# Patient Record
Sex: MALE | Race: BLACK OR AFRICAN AMERICAN | NOT HISPANIC OR LATINO | URBAN - METROPOLITAN AREA
[De-identification: names, ages, dates, MRNs, and addresses within clinical notes are randomized per-mention and may not be internally consistent; named-entity substitution may affect disease eponyms.]

---

## 2021-04-16 ENCOUNTER — INPATIENT (INPATIENT)
Facility: HOSPITAL | Age: 61
LOS: 9 days | Discharge: HOME | End: 2021-04-26
Attending: HOSPITALIST | Admitting: HOSPITALIST
Payer: MEDICARE

## 2021-04-16 VITALS
RESPIRATION RATE: 17 BRPM | HEART RATE: 101 BPM | OXYGEN SATURATION: 98 % | TEMPERATURE: 96 F | DIASTOLIC BLOOD PRESSURE: 116 MMHG | SYSTOLIC BLOOD PRESSURE: 208 MMHG

## 2021-04-16 LAB
ALBUMIN SERPL ELPH-MCNC: 4.5 G/DL — SIGNIFICANT CHANGE UP (ref 3.5–5.2)
ALP SERPL-CCNC: 186 U/L — HIGH (ref 30–115)
ALT FLD-CCNC: 50 U/L — HIGH (ref 0–41)
ANION GAP SERPL CALC-SCNC: 16 MMOL/L — HIGH (ref 7–14)
APPEARANCE UR: ABNORMAL
APTT BLD: 33.4 SEC — SIGNIFICANT CHANGE UP (ref 27–39.2)
AST SERPL-CCNC: 89 U/L — HIGH (ref 0–41)
BACTERIA # UR AUTO: NEGATIVE — SIGNIFICANT CHANGE UP
BASOPHILS # BLD AUTO: 0.02 K/UL — SIGNIFICANT CHANGE UP (ref 0–0.2)
BASOPHILS NFR BLD AUTO: 0.2 % — SIGNIFICANT CHANGE UP (ref 0–1)
BILIRUB DIRECT SERPL-MCNC: 0.3 MG/DL — HIGH (ref 0–0.2)
BILIRUB INDIRECT FLD-MCNC: 0.6 MG/DL — SIGNIFICANT CHANGE UP (ref 0.2–1.2)
BILIRUB SERPL-MCNC: 0.9 MG/DL — SIGNIFICANT CHANGE UP (ref 0.2–1.2)
BILIRUB UR-MCNC: ABNORMAL
BLD GP AB SCN SERPL QL: SIGNIFICANT CHANGE UP
BUN SERPL-MCNC: 8 MG/DL — LOW (ref 10–20)
CALCIUM SERPL-MCNC: 9.8 MG/DL — SIGNIFICANT CHANGE UP (ref 8.5–10.1)
CHLORIDE SERPL-SCNC: 101 MMOL/L — SIGNIFICANT CHANGE UP (ref 98–110)
CO2 SERPL-SCNC: 24 MMOL/L — SIGNIFICANT CHANGE UP (ref 17–32)
COLOR SPEC: YELLOW — SIGNIFICANT CHANGE UP
CREAT SERPL-MCNC: 0.8 MG/DL — SIGNIFICANT CHANGE UP (ref 0.7–1.5)
DIFF PNL FLD: SIGNIFICANT CHANGE UP
EOSINOPHIL # BLD AUTO: 0 K/UL — SIGNIFICANT CHANGE UP (ref 0–0.7)
EOSINOPHIL NFR BLD AUTO: 0 % — SIGNIFICANT CHANGE UP (ref 0–8)
EPI CELLS # UR: 0 /HPF — SIGNIFICANT CHANGE UP (ref 0–5)
GLUCOSE SERPL-MCNC: 92 MG/DL — SIGNIFICANT CHANGE UP (ref 70–99)
GLUCOSE UR QL: NEGATIVE — SIGNIFICANT CHANGE UP
HCT VFR BLD CALC: 49 % — SIGNIFICANT CHANGE UP (ref 42–52)
HGB BLD-MCNC: 16.8 G/DL — SIGNIFICANT CHANGE UP (ref 14–18)
HYALINE CASTS # UR AUTO: 2 /LPF — SIGNIFICANT CHANGE UP (ref 0–7)
IMM GRANULOCYTES NFR BLD AUTO: 0.3 % — SIGNIFICANT CHANGE UP (ref 0.1–0.3)
INR BLD: 1.1 RATIO — SIGNIFICANT CHANGE UP (ref 0.65–1.3)
KETONES UR-MCNC: ABNORMAL
LACTATE SERPL-SCNC: 0.9 MMOL/L — SIGNIFICANT CHANGE UP (ref 0.7–2)
LEUKOCYTE ESTERASE UR-ACNC: NEGATIVE — SIGNIFICANT CHANGE UP
LIDOCAIN IGE QN: 82 U/L — HIGH (ref 7–60)
LYMPHOCYTES # BLD AUTO: 0.88 K/UL — LOW (ref 1.2–3.4)
LYMPHOCYTES # BLD AUTO: 9.9 % — LOW (ref 20.5–51.1)
MCHC RBC-ENTMCNC: 29.7 PG — SIGNIFICANT CHANGE UP (ref 27–31)
MCHC RBC-ENTMCNC: 34.3 G/DL — SIGNIFICANT CHANGE UP (ref 32–37)
MCV RBC AUTO: 86.7 FL — SIGNIFICANT CHANGE UP (ref 80–94)
MONOCYTES # BLD AUTO: 0.7 K/UL — HIGH (ref 0.1–0.6)
MONOCYTES NFR BLD AUTO: 7.9 % — SIGNIFICANT CHANGE UP (ref 1.7–9.3)
NEUTROPHILS # BLD AUTO: 7.25 K/UL — HIGH (ref 1.4–6.5)
NEUTROPHILS NFR BLD AUTO: 81.7 % — HIGH (ref 42.2–75.2)
NITRITE UR-MCNC: NEGATIVE — SIGNIFICANT CHANGE UP
NRBC # BLD: 0 /100 WBCS — SIGNIFICANT CHANGE UP (ref 0–0)
NT-PROBNP SERPL-SCNC: 195 PG/ML — SIGNIFICANT CHANGE UP (ref 0–300)
PH UR: 6 — SIGNIFICANT CHANGE UP (ref 5–8)
PLATELET # BLD AUTO: 148 K/UL — SIGNIFICANT CHANGE UP (ref 130–400)
POTASSIUM SERPL-MCNC: 4.1 MMOL/L — SIGNIFICANT CHANGE UP (ref 3.5–5)
POTASSIUM SERPL-SCNC: 4.1 MMOL/L — SIGNIFICANT CHANGE UP (ref 3.5–5)
PROT SERPL-MCNC: 8.9 G/DL — HIGH (ref 6–8)
PROT UR-MCNC: ABNORMAL
PROTHROM AB SERPL-ACNC: 12.6 SEC — SIGNIFICANT CHANGE UP (ref 9.95–12.87)
RBC # BLD: 5.65 M/UL — SIGNIFICANT CHANGE UP (ref 4.7–6.1)
RBC # FLD: 13.1 % — SIGNIFICANT CHANGE UP (ref 11.5–14.5)
RBC CASTS # UR COMP ASSIST: 4 /HPF — SIGNIFICANT CHANGE UP (ref 0–4)
SODIUM SERPL-SCNC: 141 MMOL/L — SIGNIFICANT CHANGE UP (ref 135–146)
SP GR SPEC: 1.02 — SIGNIFICANT CHANGE UP (ref 1.01–1.03)
TROPONIN T SERPL-MCNC: <0.01 NG/ML — SIGNIFICANT CHANGE UP
UROBILINOGEN FLD QL: ABNORMAL
WBC # BLD: 8.88 K/UL — SIGNIFICANT CHANGE UP (ref 4.8–10.8)
WBC # FLD AUTO: 8.88 K/UL — SIGNIFICANT CHANGE UP (ref 4.8–10.8)
WBC UR QL: 2 /HPF — SIGNIFICANT CHANGE UP (ref 0–5)

## 2021-04-16 PROCEDURE — 71275 CT ANGIOGRAPHY CHEST: CPT | Mod: 26,MG

## 2021-04-16 PROCEDURE — G1004: CPT

## 2021-04-16 PROCEDURE — 99285 EMERGENCY DEPT VISIT HI MDM: CPT

## 2021-04-16 PROCEDURE — 71046 X-RAY EXAM CHEST 2 VIEWS: CPT | Mod: 26

## 2021-04-16 PROCEDURE — 74177 CT ABD & PELVIS W/CONTRAST: CPT | Mod: 26,MG

## 2021-04-16 RX ORDER — ONDANSETRON 8 MG/1
4 TABLET, FILM COATED ORAL ONCE
Refills: 0 | Status: COMPLETED | OUTPATIENT
Start: 2021-04-16 | End: 2021-04-16

## 2021-04-16 RX ORDER — SODIUM CHLORIDE 9 MG/ML
2000 INJECTION INTRAMUSCULAR; INTRAVENOUS; SUBCUTANEOUS ONCE
Refills: 0 | Status: COMPLETED | OUTPATIENT
Start: 2021-04-16 | End: 2021-04-16

## 2021-04-16 RX ORDER — MORPHINE SULFATE 50 MG/1
4 CAPSULE, EXTENDED RELEASE ORAL ONCE
Refills: 0 | Status: DISCONTINUED | OUTPATIENT
Start: 2021-04-16 | End: 2021-04-16

## 2021-04-16 RX ADMIN — MORPHINE SULFATE 4 MILLIGRAM(S): 50 CAPSULE, EXTENDED RELEASE ORAL at 21:34

## 2021-04-16 RX ADMIN — ONDANSETRON 4 MILLIGRAM(S): 8 TABLET, FILM COATED ORAL at 18:27

## 2021-04-16 RX ADMIN — MORPHINE SULFATE 4 MILLIGRAM(S): 50 CAPSULE, EXTENDED RELEASE ORAL at 18:26

## 2021-04-16 RX ADMIN — SODIUM CHLORIDE 2000 MILLILITER(S): 9 INJECTION INTRAMUSCULAR; INTRAVENOUS; SUBCUTANEOUS at 18:26

## 2021-04-16 NOTE — ED PROVIDER NOTE - NS ED ROS FT
Eyes:  No visual changes, eye pain or discharge.  ENMT:  No hearing changes, pain, discharge or infections. No neck pain or stiffness.  Cardiac:  No CP, SOB or edema. No chest pain with exertion.  Respiratory:  No cough or respiratory distress. No hemoptysis. No history of asthma or RAD.  GI:  + nausea, + vomiting, + diarrhea + abdominal pain.  :  No dysuria, frequency or burning.  MS:  No myalgia, muscle weakness, joint pain or back pain.  Neuro:  No headache or weakness.  No LOC.  Skin:  No skin rash.   Endocrine: No history of thyroid disease or diabetes.  Except as documented in the HPI,  all other systems are negative.

## 2021-04-16 NOTE — ED PROVIDER NOTE - OBJECTIVE STATEMENT
Pt is a 61y/o male with a pmhx of alcoholism, HTN, DVT on xarelto presents today for eval of constant anna-umbilical/non-radiating abd pain with associated n/v/d x 2 days. Pt denies fever, chills, weakness, numbness, CP, SOB.

## 2021-04-16 NOTE — ED ADULT TRIAGE NOTE - CHIEF COMPLAINT QUOTE
Pt complaining of umbical pain, NVD x 3 days. Pt hx of alcoholism, DVT, HTN . Pt states he drinks about 6 pints a day. last drink this AM. FS in triage 106

## 2021-04-16 NOTE — ED PROVIDER NOTE - ATTENDING CONTRIBUTION TO CARE
Patient presents to ED c/o abd pain with n/v, Patient states that, he drinks alcohol daily and could not drink due to n/v, h/o DVT and could not take his medications due to N/V. patient is c/o sob, denies cp.   Vitals reviewed.   Patient is awake, alert, appears uncomfortable and in pain.   Lungs: CTA  abd: +BS, +periumbilical tenderness, ND, soft  A/P: Abd pain with n/v and also has sob, missed his elaquis x 2 days.   labs, IVF, imaging, reevaluation.   Symptomatic treatment.

## 2021-04-16 NOTE — ED PROVIDER NOTE - PHYSICAL EXAMINATION
VITAL SIGNS: I have reviewed nursing notes and confirm.  CONSTITUTIONAL: Well-developed; well-nourished; in no acute distress.   SKIN: skin exam is warm and dry, no acute rash.    HEAD: Normocephalic; atraumatic.  EYES: conjunctiva and sclera clear.  ENT: No nasal discharge; airway clear.  CARD: S1, S2 normal; no murmurs, gallops, or rubs. Regular rate and rhythm.   RESP: No wheezes, rales or rhonchi.  ABD: TTP LLQ, Periumbilical region, Normal bowel sounds; soft; non-distended  EXT: Normal ROM.  No clubbing, cyanosis or edema.   NEURO: Alert, oriented, grossly unremarkable

## 2021-04-17 DIAGNOSIS — R09.89 OTHER SPECIFIED SYMPTOMS AND SIGNS INVOLVING THE CIRCULATORY AND RESPIRATORY SYSTEMS: ICD-10-CM

## 2021-04-17 LAB — SARS-COV-2 RNA SPEC QL NAA+PROBE: SIGNIFICANT CHANGE UP

## 2021-04-17 PROCEDURE — 93010 ELECTROCARDIOGRAM REPORT: CPT

## 2021-04-17 PROCEDURE — 99223 1ST HOSP IP/OBS HIGH 75: CPT

## 2021-04-17 PROCEDURE — 76705 ECHO EXAM OF ABDOMEN: CPT | Mod: 26

## 2021-04-17 RX ORDER — AMLODIPINE BESYLATE 2.5 MG/1
5 TABLET ORAL DAILY
Refills: 0 | Status: DISCONTINUED | OUTPATIENT
Start: 2021-04-17 | End: 2021-04-26

## 2021-04-17 RX ORDER — FOLIC ACID 0.8 MG
1 TABLET ORAL DAILY
Refills: 0 | Status: DISCONTINUED | OUTPATIENT
Start: 2021-04-17 | End: 2021-04-26

## 2021-04-17 RX ORDER — SODIUM CHLORIDE 9 MG/ML
1000 INJECTION, SOLUTION INTRAVENOUS
Refills: 0 | Status: DISCONTINUED | OUTPATIENT
Start: 2021-04-17 | End: 2021-04-21

## 2021-04-17 RX ORDER — LABETALOL HCL 100 MG
10 TABLET ORAL ONCE
Refills: 0 | Status: DISCONTINUED | OUTPATIENT
Start: 2021-04-17 | End: 2021-04-17

## 2021-04-17 RX ORDER — CHLORHEXIDINE GLUCONATE 213 G/1000ML
1 SOLUTION TOPICAL
Refills: 0 | Status: DISCONTINUED | OUTPATIENT
Start: 2021-04-17 | End: 2021-04-26

## 2021-04-17 RX ORDER — PANTOPRAZOLE SODIUM 20 MG/1
40 TABLET, DELAYED RELEASE ORAL
Refills: 0 | Status: DISCONTINUED | OUTPATIENT
Start: 2021-04-17 | End: 2021-04-26

## 2021-04-17 RX ORDER — MORPHINE SULFATE 50 MG/1
2 CAPSULE, EXTENDED RELEASE ORAL EVERY 4 HOURS
Refills: 0 | Status: DISCONTINUED | OUTPATIENT
Start: 2021-04-17 | End: 2021-04-21

## 2021-04-17 RX ORDER — RIVAROXABAN 15 MG-20MG
20 KIT ORAL
Refills: 0 | Status: DISCONTINUED | OUTPATIENT
Start: 2021-04-17 | End: 2021-04-26

## 2021-04-17 RX ORDER — SODIUM CHLORIDE 9 MG/ML
1000 INJECTION INTRAMUSCULAR; INTRAVENOUS; SUBCUTANEOUS
Refills: 0 | Status: DISCONTINUED | OUTPATIENT
Start: 2021-04-17 | End: 2021-04-17

## 2021-04-17 RX ORDER — THIAMINE MONONITRATE (VIT B1) 100 MG
100 TABLET ORAL ONCE
Refills: 0 | Status: COMPLETED | OUTPATIENT
Start: 2021-04-17 | End: 2021-04-17

## 2021-04-17 RX ORDER — OXYCODONE AND ACETAMINOPHEN 5; 325 MG/1; MG/1
1 TABLET ORAL EVERY 6 HOURS
Refills: 0 | Status: DISCONTINUED | OUTPATIENT
Start: 2021-04-17 | End: 2021-04-24

## 2021-04-17 RX ADMIN — SODIUM CHLORIDE 150 MILLILITER(S): 9 INJECTION, SOLUTION INTRAVENOUS at 08:04

## 2021-04-17 RX ADMIN — MORPHINE SULFATE 2 MILLIGRAM(S): 50 CAPSULE, EXTENDED RELEASE ORAL at 14:26

## 2021-04-17 RX ADMIN — MORPHINE SULFATE 2 MILLIGRAM(S): 50 CAPSULE, EXTENDED RELEASE ORAL at 09:38

## 2021-04-17 RX ADMIN — MORPHINE SULFATE 2 MILLIGRAM(S): 50 CAPSULE, EXTENDED RELEASE ORAL at 14:41

## 2021-04-17 RX ADMIN — Medication 1 MILLIGRAM(S): at 11:29

## 2021-04-17 RX ADMIN — SODIUM CHLORIDE 150 MILLILITER(S): 9 INJECTION, SOLUTION INTRAVENOUS at 16:00

## 2021-04-17 RX ADMIN — PANTOPRAZOLE SODIUM 40 MILLIGRAM(S): 20 TABLET, DELAYED RELEASE ORAL at 05:42

## 2021-04-17 RX ADMIN — AMLODIPINE BESYLATE 5 MILLIGRAM(S): 2.5 TABLET ORAL at 06:54

## 2021-04-17 RX ADMIN — MORPHINE SULFATE 2 MILLIGRAM(S): 50 CAPSULE, EXTENDED RELEASE ORAL at 04:15

## 2021-04-17 RX ADMIN — MORPHINE SULFATE 2 MILLIGRAM(S): 50 CAPSULE, EXTENDED RELEASE ORAL at 19:52

## 2021-04-17 RX ADMIN — SODIUM CHLORIDE 200 MILLILITER(S): 9 INJECTION INTRAMUSCULAR; INTRAVENOUS; SUBCUTANEOUS at 05:42

## 2021-04-17 RX ADMIN — Medication 1 TABLET(S): at 11:29

## 2021-04-17 RX ADMIN — MORPHINE SULFATE 2 MILLIGRAM(S): 50 CAPSULE, EXTENDED RELEASE ORAL at 09:23

## 2021-04-17 RX ADMIN — SODIUM CHLORIDE 200 MILLILITER(S): 9 INJECTION INTRAMUSCULAR; INTRAVENOUS; SUBCUTANEOUS at 04:06

## 2021-04-17 RX ADMIN — RIVAROXABAN 20 MILLIGRAM(S): KIT at 17:26

## 2021-04-17 RX ADMIN — CHLORHEXIDINE GLUCONATE 1 APPLICATION(S): 213 SOLUTION TOPICAL at 05:42

## 2021-04-17 RX ADMIN — MORPHINE SULFATE 2 MILLIGRAM(S): 50 CAPSULE, EXTENDED RELEASE ORAL at 20:49

## 2021-04-17 RX ADMIN — SODIUM CHLORIDE 150 MILLILITER(S): 9 INJECTION, SOLUTION INTRAVENOUS at 22:38

## 2021-04-17 RX ADMIN — Medication 100 MILLIGRAM(S): at 04:15

## 2021-04-17 NOTE — H&P ADULT - NSICDXPASTMEDICALHX_GEN_ALL_CORE_FT
PAST MEDICAL HISTORY:  DVT (deep venous thrombosis)     EtOH dependence     H/O chronic pancreatitis     HTN (hypertension)

## 2021-04-17 NOTE — H&P ADULT - NSHPPHYSICALEXAM_GEN_ALL_CORE
Vital Signs (24 Hrs):  T(C): 35.8 (04-16-21 @ 16:31), Max: 35.8 (04-16-21 @ 16:31)  HR: 80 (04-17-21 @ 03:00) (80 - 101)  BP: 169/74 (04-17-21 @ 03:00) (169/74 - 208/116)  RR: 18 (04-17-21 @ 03:00) (16 - 18)  SpO2: 99% (04-17-21 @ 03:00) (98% - 99%) Vital Signs (24 Hrs):  T(C): 35.8 (04-16-21 @ 16:31), Max: 35.8 (04-16-21 @ 16:31)  HR: 80 (04-17-21 @ 03:00) (80 - 101)  BP: 169/74 (04-17-21 @ 03:00) (169/74 - 208/116)  RR: 18 (04-17-21 @ 03:00) (16 - 18)  SpO2: 99% (04-17-21 @ 03:00) (98% - 99%)    PHYSICAL EXAM:  GENERAL: NAD, lying in bed comfortably  HEAD:  Atraumatic, Normocephalic  EYES: EOMI, PERRLA, conjunctiva and sclera clear  ENT: Moist mucous membranes  NECK: Supple, No JVD  CHEST/LUNG: Clear to auscultation bilaterally; No rales, rhonchi, wheezing, or rubs. Unlabored respirations  HEART: Regular rate and rhythm; No murmurs, rubs, or gallops  ABDOMEN: diffuse abdominal tenderness , no rebound / guarding , myers sign negative  EXTREMITIES:  2+ Peripheral Pulses, brisk capillary refill. No clubbing, cyanosis, or edema  NERVOUS SYSTEM:  Alert & Oriented X3, speech clear. No deficits   MSK: FROM all 4 extremities, full and equal strength  SKIN: No rashes or lesions

## 2021-04-17 NOTE — H&P ADULT - HISTORY OF PRESENT ILLNESS
59 yo male with pmh of alcoholism, HTN, DVT on Xarelto presents to the hospital with 2 day history of constant non radiating anna-umbilical abdominal pain associated with nausea and nbnb vomiting.  The patient reports      T(C): 35.8 , HR: 80 ,BP: 169/74 , RR: 18, SpO2: 99% on room air  Labs: transaminitis, lipase 82   RUQ U/S: hepatic steatosis; no cholelithiasis , CBD and intrahepatic biliary ducts unremarkable  CT Abdomen and Pelvis w/ IV Cont:  acute on chronic pancreatitis. No peripancreatic fluid collection. Main pancreatic duct measures 5-6 mm, nonspecific and without priors for comparison.               61 yo male with pmh of alcohol use disorder, HTN, DVT on Xarelto presents to the hospital with 2 day history of constant 10/10 non radiating anna-umbilical abdominal pain associated with nausea and nbnb vomiting. He reports he drinks about 5 pints of vodka / wine daily and his last drink was yesterday which resulted in vomiting. The patient notes that he has been drinking for many years. All other ros negative including chest pain, shortness of breath, constipation, diarrhea, dysuria       T(C): 35.8 , HR: 80 ,BP: 169/74 , RR: 18, SpO2: 99% on room air  Labs: transaminitis, lipase 82   RUQ U/S: hepatic steatosis; no cholelithiasis , CBD and intrahepatic biliary ducts unremarkable  CT Abdomen and Pelvis w/ IV Cont:  acute on chronic pancreatitis. No peripancreatic fluid collection. Main pancreatic duct measures 5-6 mm, nonspecific and without priors for comparison.

## 2021-04-17 NOTE — H&P ADULT - ASSESSMENT
61 yo male with pmh of alcoholism, HTN, DVT on Xarelto presents to the hospital with 2 day history of constant non radiating anna-umbilical abdominal pain associated with nausea and nbnb vomiting.      # Acute on chronic pancreatitis  - no sirs on admission  - CT Abdomen and Pelvis w/ IV Cont:  acute on chronic pancreatitis. Pancreatic calcifications No peripancreatic fluid collection.   - RUQ U/S: hepatic steatosis; no cholelithiasis , CBD and intrahepatic biliary ducts unremarkable  - lipase 82   - MRCP  - NS @ 200 cc/hr   - monitor bun, hct, fluid status  - NPO , advance when symptoms improve  - pain control       # Incidental finding of main pancreatic duct of 5-6 mm  likely secondary to h/o chronic pancreatitis , f/u with GI outpatient    # h/o HTN    # h/o DVT , on Xarelto    # h/o Alcohol use disorder  - last drink       # DVT PPX: Xarelto  # GI PPX: PPI  # Activity: as tolerated  # Diet: NPO  # CHG BATH   59 yo male with pmh of alcoholism, HTN, DVT on Xarelto presents to the hospital with 2 day history of constant non radiating anna-umbilical abdominal pain associated with nausea and nbnb vomiting.      # Acute on chronic pancreatitis  - no sirs on admission  - CT A/P w/ IV Cont:acute on chronic pancreatitis. Pancreatic calcifications No peripancreatic fluid collection. Main pancreatic duct of 5-6 mm  - RUQ U/S: hepatic steatosis; no cholelithiasis , CBD and intrahepatic biliary ducts unremarkable  - lipase 82   - MRCP f/u   - NS @ 200 cc/hr   - monitor bun, hct, fluid status  - NPO , advance when symptoms improve  - pain control       # h/o HTN  - patient takes 5 mg and 10 mg of amlodipine at home   - hold 10 mg , start 5 mg and increase prn to 10 if needed    # h/o b/l DVT , on Xarelto    # h/o Alcohol use disorder  - last drink 1 day ago  - [facility] protocol   - catch team  - thiamine x1   - c/w folic acid and start multivitamin    # DVT PPX: Xarelto  # GI PPX: PPI  # Activity: as tolerated  # Diet: NPO  # CHG BATH

## 2021-04-17 NOTE — H&P ADULT - NSHPLABSRESULTS_GEN_ALL_CORE
16.8   8.88  )-----------( 148      ( 16 Apr 2021 18:02 )             49.0     04-16-21 @ 18:02    141  |  101  |  8<L>             --------------------------< 92     4.1  |  24  | 0.8    eGFR AA: 112  eGFR N-AA: 96    Calcium: 9.8  Phosphorus: --  Magnesium: --    AST: 89<H>    ALT: 50<H>  AlkPhos: 186<H>  Protein: 8.9<H>  Albumin: 4.5  TBili: 0.9  D-Bili: 0.3<H>    Troponin T, Serum (04.16.21 @ 18:02)    Troponin T, Serum: <0.01 ng/mL    Lipase, Serum (04.16.21 @ 18:02)    Lipase, Serum: 82 U/L    < from: US Abdomen Limited (04.17.21 @ 00:16) >    IMPRESSION:    Hepatic steatosis.  Pancreas obscured by overlying bowel gas.  Otherwise unremarkable right upper quadrant ultrasound.    < end of copied text >    < from: CT Abdomen and Pelvis w/ IV Cont (04.16.21 @ 21:37) >    IMPRESSION:    No evidence for acute pulmonary embolus.    Findings compatible with acute on chronic pancreatitis.  No peripancreatic fluid collection.  Main pancreatic duct measures 5-6 mm, nonspecific and without priors for comparison.    < end of copied text >

## 2021-04-17 NOTE — H&P ADULT - ATTENDING COMMENTS
HPI:  61 yo male with pmh of alcohol use disorder, HTN, DVT on Xarelto presents to the hospital with 2 day history of constant 10/10 non radiating anna-umbilical abdominal pain associated with nausea and nbnb vomiting. He reports he drinks about 5 pints of vodka / wine daily and his last drink was yesterday which resulted in vomiting. The patient notes that he has been drinking for many years. All other ros negative including chest pain, shortness of breath, constipation, diarrhea, dysuria       T(C): 35.8 , HR: 80 ,BP: 169/74 , RR: 18, SpO2: 99% on room air  Labs: transaminitis, lipase 82   RUQ U/S: hepatic steatosis; no cholelithiasis , CBD and intrahepatic biliary ducts unremarkable  CT Abdomen and Pelvis w/ IV Cont:  acute on chronic pancreatitis. No peripancreatic fluid collection. Main pancreatic duct measures 5-6 mm, nonspecific and without priors for comparison.     (17 Apr 2021 03:16)    REVIEW OF SYSTEMS:   CONSTITUTIONAL: No weakness, fevers or chills  EYES/ENT: No visual changes;  No vertigo or throat pain   NECK: No pain or stiffness  RESPIRATORY: No cough, wheezing, hemoptysis; No shortness of breath  CARDIOVASCULAR: No chest pain or palpitations  GASTROINTESTINAL: (+) abdominal - epigastric pain. No nausea, vomiting, or hematemesis; No diarrhea or constipation. No melena or hematochezia.  GENITOURINARY: No dysuria, frequency or hematuria  NEUROLOGICAL: No numbness or weakness  SKIN: No itching, rashes    Physical Exam:   General: WN/WD NAD  Neurology: A&Ox3, nonfocal, follows commands, mild tremors   Eyes: PERRLA/ EOMI  ENT/Neck: Neck supple, trachea midline, No JVD  Respiratory: CTA B/L, No wheezing, rales, rhonchi  CV: Normal rate regular rhythm, S1S2, no murmurs, rubs or gallops  Abdominal: Soft, NT, ND +BS,   Extremities: No edema, + peripheral pulses  Skin: No Rashes, Hematoma, Ecchymosis  Incisions: n/a  Tubes: n/a    A/p  Acute on chronic alcoholic pancreatitis   -NPO and aggressive hydration   -serial CMP, Lipase and CBC   -PRN pain Rx    HTN - possibly related to Alcohol use disorder   -c/w amlodipine     H/o bilateral DVT on lifelong Rx  -patient re-educated about need for compliance     Alcohol use disorder   -cessation d/w patient   -MVI/ Thiamine / Folic acid  -[facility] protocol   -Catch team HPI:  61 yo male with pmh of alcohol use disorder, HTN, DVT on Xarelto presents to the hospital with 2 day history of constant 10/10 non radiating anna-umbilical abdominal pain associated with nausea and nbnb vomiting. He reports he drinks about 5 pints of vodka / wine daily and his last drink was yesterday which resulted in vomiting. The patient notes that he has been drinking for many years. All other ros negative including chest pain, shortness of breath, constipation, diarrhea, dysuria       T(C): 35.8 , HR: 80 ,BP: 169/74 , RR: 18, SpO2: 99% on room air  Labs: transaminitis, lipase 82   RUQ U/S: hepatic steatosis; no cholelithiasis , CBD and intrahepatic biliary ducts unremarkable  CT Abdomen and Pelvis w/ IV Cont:  acute on chronic pancreatitis. No peripancreatic fluid collection. Main pancreatic duct measures 5-6 mm, nonspecific and without priors for comparison.     (17 Apr 2021 03:16)    REVIEW OF SYSTEMS: see cc/HPI   CONSTITUTIONAL: No weakness, fevers or chills  EYES/ENT: No visual changes;  No vertigo or throat pain   NECK: No pain or stiffness  RESPIRATORY: No cough, wheezing, hemoptysis; No shortness of breath  CARDIOVASCULAR: No chest pain or palpitations  GASTROINTESTINAL: (+) abdominal - epigastric pain. (+) nausea/vomiting, NO hematemesis; No diarrhea or constipation. No melena or hematochezia.  GENITOURINARY: No dysuria, frequency or hematuria  NEUROLOGICAL: No numbness or weakness, (+) tremors   SKIN: No itching, rashes    Physical Exam:   General: WN/WD NAD  Neurology: A&Ox3, nonfocal, follows commands, mild tremors   Eyes: PERRLA/ EOMI  ENT/Neck: Neck supple, trachea midline, No JVD  Respiratory: CTA B/L, No wheezing, rales, rhonchi  CV: Normal rate regular rhythm, S1S2, no murmurs, rubs or gallops  Abdominal: Soft, (+) tenderness diffusely but worse in the lower quadrants, (+) guarding, ND +BS,   Extremities: No edema, + peripheral pulses  Skin: No Rashes, Hematoma, Ecchymosis  Incisions: n/a  Tubes: n/a    A/p  Acute on chronic alcoholic pancreatitis   -NPO and aggressive hydration   -serial CMP, Lipase and CBC   -PRN pain Rx  -IV PPI    HTN - possibly related to Alcohol use disorder   -c/w amlodipine     H/o bilateral DVT on lifelong Rx  -patient re-educated about need for compliance     Alcohol use disorder   -cessation d/w patient   -MVI/ Thiamine / Folic acid  -CIWA protocol   -Catch team HPI:  61 yo male with pmh of alcohol use disorder, HTN, DVT on Xarelto presents to the hospital with 2 day history of constant 10/10 non radiating anna-umbilical abdominal pain associated with nausea and nbnb vomiting. He reports he drinks about 5 pints of vodka / wine daily and his last drink was yesterday which resulted in vomiting. The patient notes that he has been drinking for many years. All other ros negative including chest pain, shortness of breath, constipation, diarrhea, dysuria       T(C): 35.8 , HR: 80 ,BP: 169/74 , RR: 18, SpO2: 99% on room air  Labs: transaminitis, lipase 82   RUQ U/S: hepatic steatosis; no cholelithiasis , CBD and intrahepatic biliary ducts unremarkable  CT Abdomen and Pelvis w/ IV Cont:  acute on chronic pancreatitis. No peripancreatic fluid collection. Main pancreatic duct measures 5-6 mm, nonspecific and without priors for comparison.     (17 Apr 2021 03:16)    REVIEW OF SYSTEMS: see cc/HPI   CONSTITUTIONAL: No weakness, fevers or chills  EYES/ENT: No visual changes;  No vertigo or throat pain   NECK: No pain or stiffness  RESPIRATORY: No cough, wheezing, hemoptysis; No shortness of breath  CARDIOVASCULAR: No chest pain or palpitations  GASTROINTESTINAL: (+) abdominal - epigastric pain. (+) nausea/vomiting, NO hematemesis; No diarrhea or constipation. No melena or hematochezia.  GENITOURINARY: No dysuria, frequency or hematuria  NEUROLOGICAL: No numbness or weakness, (+) tremors   SKIN: No itching, rashes    Physical Exam:   General: WN/WD NAD  Neurology: A&Ox3, nonfocal, follows commands, mild tremors   Eyes: PERRLA/ EOMI  ENT/Neck: Neck supple, trachea midline, No JVD  Respiratory: CTA B/L, No wheezing, rales, rhonchi  CV: Normal rate regular rhythm, S1S2, no murmurs, rubs or gallops  Abdominal: Soft, (+) tenderness diffusely but worse in the lower quadrants, (+) guarding, ND +BS,   Extremities: No edema, + peripheral pulses  Skin: No Rashes, Hematoma, Ecchymosis  Incisions: n/a  Tubes: n/a    A/p  Acute on chronic alcoholic pancreatitis   -NPO and aggressive hydration   -serial CMP, Lipase, lipid panel and CBC   -PRN pain Rx  -IV PPI    HTN - possibly related to Alcohol use disorder   -c/w amlodipine     H/o bilateral DVT on lifelong Rx  -patient re-educated about need for compliance     Alcohol use disorder   -cessation d/w patient   -MVI/ Thiamine / Folic acid  -[facility] protocol   -Catch team

## 2021-04-17 NOTE — CONSULT NOTE ADULT - SUBJECTIVE AND OBJECTIVE BOX
Addiction medicine consult placed for "alcohol abuse, here for pancreatitis, drinks vodka". Per resident, patient is already on CIWA protocol with ativan, no acute withdrawal management recommend recommendations necessary at this time, though patient does require referral for services.  CATCH team social workers made aware, will follow patient.

## 2021-04-17 NOTE — PROGRESS NOTE ADULT - SUBJECTIVE AND OBJECTIVE BOX
pt seen and examined.   still has epigastric pain, moderated.         Vital Signs Last 24 Hrs  T(C): 35.8 (16 Apr 2021 16:31), Max: 35.8 (16 Apr 2021 16:31)  T(F): 96.5 (16 Apr 2021 16:31), Max: 96.5 (16 Apr 2021 16:31)  HR: 66 (17 Apr 2021 10:54) (66 - 101)  BP: 180/108 (17 Apr 2021 10:54) (169/74 - 208/116)  BP(mean): --  RR: 18 (17 Apr 2021 06:50) (16 - 18)  SpO2: 98% (17 Apr 2021 07:35) (98% - 99%)  Daily     Daily   I&O's Summary      Physical exam:   constitutional NAD, AAOX3, Respiratory  lungs CTA, CVS heart RRR, GI: abdomen Soft NT, ND, BS+, skin: intact  neuro exam non focal.   POCT Blood Glucose.: 106 mg/dL (04-16-21 @ 16:35)                          16.8   8.88  )-----------( 148      ( 16 Apr 2021 18:02 )             49.0     04-16    141  |  101  |  8<L>  ----------------------------<  92  4.1   |  24  |  0.8    Ca    9.8      16 Apr 2021 18:02    TPro  8.9<H>  /  Alb  4.5  /  TBili  0.9  /  DBili  0.3<H>  /  AST  89<H>  /  ALT  50<H>  /  AlkPhos  186<H>  04-16      COVID-19 PCR: NotDetec (04-17-21 @ 02:03)      CARDIAC MARKERS ( 16 Apr 2021 18:02 )  x     / <0.01 ng/mL / x     / x     / x          PT/INR - ( 16 Apr 2021 18:02 )   PT: 12.60 sec;   INR: 1.10 ratio    PTT - ( 16 Apr 2021 18:02 )  PTT:33.4 sec      MEDICATIONS  (STANDING):  amLODIPine   Tablet 5 milliGRAM(s) Oral daily  chlorhexidine 4% Liquid 1 Application(s) Topical <User Schedule>  folic acid 1 milliGRAM(s) Oral daily  lactated ringers. 1000 milliLiter(s) (150 mL/Hr) IV Continuous <Continuous>  multivitamin 1 Tablet(s) Oral daily  pantoprazole    Tablet 40 milliGRAM(s) Oral before breakfast  rivaroxaban 20 milliGRAM(s) Oral with dinner    MEDICATIONS  (PRN):  LORazepam   Injectable 2 milliGRAM(s) IV Push every 2 hours PRN Symptom-triggered: 2 point increase in CIWA -Ar score and a total score of 7 or LESS  morphine  - Injectable 2 milliGRAM(s) IV Push every 4 hours PRN Severe Pain (7 - 10)  oxycodone    5 mG/acetaminophen 325 mG 1 Tablet(s) Oral every 6 hours PRN Moderate Pain (4 - 6)      PAST MEDICAL & SURGICAL HISTORY:  DVT (deep venous thrombosis)    HTN (hypertension)    EtOH dependence    H/O chronic pancreatitis    < from: CT Abdomen and Pelvis w/ IV Cont (04.16.21 @ 21:37) >  No evidence for acute pulmonary embolus.    Findings compatible with acute on chronic pancreatitis.  No peripancreatic fluid collection.  Main pancreatic duct measures 5-6 mm, nonspecific and without priors for comparison.    < end of copied text >    a/p  # acute pancreatitis, due to etoh, last drink was friday ( yesterday), no sign of withdrawal.   clear liquids, advance as tolerated  cont ivf  pain meds    # etoh abuse, ciwa for prn symtoms, no need for meds now  # poss thimain and folate def , replace    #Progress Note Handoff  Pending (specify):  clinical improvement   Family discussion: dw pt, pt is fully aaox3, understands the plan of care  Disposition: Home___

## 2021-04-18 LAB
ALBUMIN SERPL ELPH-MCNC: 3.4 G/DL — LOW (ref 3.5–5.2)
ALP SERPL-CCNC: 107 U/L — SIGNIFICANT CHANGE UP (ref 30–115)
ALT FLD-CCNC: 27 U/L — SIGNIFICANT CHANGE UP (ref 0–41)
ANION GAP SERPL CALC-SCNC: 15 MMOL/L — HIGH (ref 7–14)
AST SERPL-CCNC: 46 U/L — HIGH (ref 0–41)
BILIRUB SERPL-MCNC: 0.8 MG/DL — SIGNIFICANT CHANGE UP (ref 0.2–1.2)
BUN SERPL-MCNC: 7 MG/DL — LOW (ref 10–20)
CALCIUM SERPL-MCNC: 8.7 MG/DL — SIGNIFICANT CHANGE UP (ref 8.5–10.1)
CHLORIDE SERPL-SCNC: 101 MMOL/L — SIGNIFICANT CHANGE UP (ref 98–110)
CHOLEST SERPL-MCNC: 169 MG/DL — SIGNIFICANT CHANGE UP
CO2 SERPL-SCNC: 23 MMOL/L — SIGNIFICANT CHANGE UP (ref 17–32)
CREAT SERPL-MCNC: 0.7 MG/DL — SIGNIFICANT CHANGE UP (ref 0.7–1.5)
CULTURE RESULTS: NO GROWTH — SIGNIFICANT CHANGE UP
GLUCOSE SERPL-MCNC: 81 MG/DL — SIGNIFICANT CHANGE UP (ref 70–99)
HCT VFR BLD CALC: 38.8 % — LOW (ref 42–52)
HDLC SERPL-MCNC: 62 MG/DL — SIGNIFICANT CHANGE UP
HGB BLD-MCNC: 13.1 G/DL — LOW (ref 14–18)
LIPID PNL WITH DIRECT LDL SERPL: 92 MG/DL — SIGNIFICANT CHANGE UP
MAGNESIUM SERPL-MCNC: 1 MG/DL — LOW (ref 1.8–2.4)
MCHC RBC-ENTMCNC: 29.4 PG — SIGNIFICANT CHANGE UP (ref 27–31)
MCHC RBC-ENTMCNC: 33.8 G/DL — SIGNIFICANT CHANGE UP (ref 32–37)
MCV RBC AUTO: 87 FL — SIGNIFICANT CHANGE UP (ref 80–94)
NON HDL CHOLESTEROL: 107 MG/DL — SIGNIFICANT CHANGE UP
NRBC # BLD: 0 /100 WBCS — SIGNIFICANT CHANGE UP (ref 0–0)
PLATELET # BLD AUTO: 95 K/UL — LOW (ref 130–400)
POTASSIUM SERPL-MCNC: 3.9 MMOL/L — SIGNIFICANT CHANGE UP (ref 3.5–5)
POTASSIUM SERPL-SCNC: 3.9 MMOL/L — SIGNIFICANT CHANGE UP (ref 3.5–5)
PROT SERPL-MCNC: 6.3 G/DL — SIGNIFICANT CHANGE UP (ref 6–8)
RBC # BLD: 4.46 M/UL — LOW (ref 4.7–6.1)
RBC # FLD: 12.9 % — SIGNIFICANT CHANGE UP (ref 11.5–14.5)
SODIUM SERPL-SCNC: 139 MMOL/L — SIGNIFICANT CHANGE UP (ref 135–146)
SPECIMEN SOURCE: SIGNIFICANT CHANGE UP
TRIGL SERPL-MCNC: 96 MG/DL — SIGNIFICANT CHANGE UP
WBC # BLD: 3.53 K/UL — LOW (ref 4.8–10.8)
WBC # FLD AUTO: 3.53 K/UL — LOW (ref 4.8–10.8)

## 2021-04-18 PROCEDURE — 99232 SBSQ HOSP IP/OBS MODERATE 35: CPT

## 2021-04-18 RX ORDER — MAGNESIUM SULFATE 500 MG/ML
2 VIAL (ML) INJECTION
Refills: 0 | Status: COMPLETED | OUTPATIENT
Start: 2021-04-18 | End: 2021-04-18

## 2021-04-18 RX ADMIN — CHLORHEXIDINE GLUCONATE 1 APPLICATION(S): 213 SOLUTION TOPICAL at 05:32

## 2021-04-18 RX ADMIN — MORPHINE SULFATE 2 MILLIGRAM(S): 50 CAPSULE, EXTENDED RELEASE ORAL at 01:23

## 2021-04-18 RX ADMIN — Medication 50 GRAM(S): at 17:35

## 2021-04-18 RX ADMIN — RIVAROXABAN 20 MILLIGRAM(S): KIT at 17:35

## 2021-04-18 RX ADMIN — Medication 1 TABLET(S): at 11:44

## 2021-04-18 RX ADMIN — Medication 50 GRAM(S): at 16:05

## 2021-04-18 RX ADMIN — AMLODIPINE BESYLATE 5 MILLIGRAM(S): 2.5 TABLET ORAL at 05:32

## 2021-04-18 RX ADMIN — Medication 2 MILLIGRAM(S): at 11:48

## 2021-04-18 RX ADMIN — MORPHINE SULFATE 2 MILLIGRAM(S): 50 CAPSULE, EXTENDED RELEASE ORAL at 05:33

## 2021-04-18 RX ADMIN — Medication 50 GRAM(S): at 18:20

## 2021-04-18 RX ADMIN — Medication 1 MILLIGRAM(S): at 11:44

## 2021-04-18 RX ADMIN — MORPHINE SULFATE 2 MILLIGRAM(S): 50 CAPSULE, EXTENDED RELEASE ORAL at 16:18

## 2021-04-18 RX ADMIN — SODIUM CHLORIDE 150 MILLILITER(S): 9 INJECTION, SOLUTION INTRAVENOUS at 16:05

## 2021-04-18 RX ADMIN — SODIUM CHLORIDE 150 MILLILITER(S): 9 INJECTION, SOLUTION INTRAVENOUS at 11:49

## 2021-04-18 RX ADMIN — MORPHINE SULFATE 2 MILLIGRAM(S): 50 CAPSULE, EXTENDED RELEASE ORAL at 09:35

## 2021-04-18 RX ADMIN — MORPHINE SULFATE 2 MILLIGRAM(S): 50 CAPSULE, EXTENDED RELEASE ORAL at 14:29

## 2021-04-18 RX ADMIN — SODIUM CHLORIDE 150 MILLILITER(S): 9 INJECTION, SOLUTION INTRAVENOUS at 05:34

## 2021-04-18 RX ADMIN — PANTOPRAZOLE SODIUM 40 MILLIGRAM(S): 20 TABLET, DELAYED RELEASE ORAL at 05:32

## 2021-04-18 RX ADMIN — MORPHINE SULFATE 2 MILLIGRAM(S): 50 CAPSULE, EXTENDED RELEASE ORAL at 11:44

## 2021-04-18 RX ADMIN — MORPHINE SULFATE 2 MILLIGRAM(S): 50 CAPSULE, EXTENDED RELEASE ORAL at 07:18

## 2021-04-18 RX ADMIN — Medication 2 MILLIGRAM(S): at 14:34

## 2021-04-18 RX ADMIN — MORPHINE SULFATE 2 MILLIGRAM(S): 50 CAPSULE, EXTENDED RELEASE ORAL at 00:50

## 2021-04-18 NOTE — PROGRESS NOTE ADULT - ASSESSMENT
59 yo male with pmh of alcohol use disorder, HTN, DVT on Xarelto presents to the hospital with 2 day history of constant 10/10 non radiating anna-umbilical abdominal pain associated with nausea and nbnb vomiting. He reports he drinks about 5 pints of vodka / wine daily and his last drink was yesterday which resulted in vomiting. The patient notes that he has been drinking for many years, he  was diagnosed with acute recurrent alcohol induced pancreatitis, pt was started on IV hydration, pain meds PRN, kept NPO. Addiction medicine was consulted.    A/P     # Acute on chronic Etoh induced  pancreatitis  - aggressive IV hydration   - pain medications PRN   - started on clear liquid diet, still in pain   - will advance diet in 24 hours  if pt'll be less symptomatic   - he was consulted regarding Etoh abstinence  - f/u repeat pancreatic enzymes in AM      # Alcoholism / Etoh induced liver injury   - pt was consulted by abstinence   - ciwa protocol   - catch team consulted    - c/w folic acid and start multivitamin  - monitor and replete electrolytes , replete Mg today   - monitor LFTS  - RUQ US noted   - avoid hepatotoxic medications     # h/o HTN  - DASH diet   - on amlodipine     # Thrombocytopenia   - likely due to liver Dz  - monitor platelets     # h/o b/l DVT   -  on Xarelto  - repeat LE Doppler   - pt has a high risk for AC ( heavy drinker)     # DVT PPX: Xarelto  # GI PPX: PPI  # Activity: as tolerated    #Progress Note Handoff  Pending (specify):  c/w clear liquid diet, replete Mg, get LE Doppler, f/u repeat pancreatic enzymes in AM   Family discussion: I spoke with pt, he agreed with a plan of care   Disposition: Home_x __/SNF___/Other________/Unknown at this time________

## 2021-04-18 NOTE — PROGRESS NOTE ADULT - SUBJECTIVE AND OBJECTIVE BOX
Patient is a 60y old  Male who presents with a chief complaint of abdominal pain, was diagnosed with acute recurrent alcohol induced pancreatitis, pt was started on IV hydration, pain meds PRN, kept NPO. Addiction medicine was consulted.  Today pt is c/o constipation and LUQ abdominal pain, asking for pain medications.       Vital Signs Last 24 Hrs  T(C): 37.3 (2021 13:00), Max: 37.3 (2021 13:00)  T(F): 99.1 (2021 13:00), Max: 99.1 (2021 13:00)  HR: 86 (2021 13:00) (66 - 86)  BP: 159/104 (2021 13:00) (136/86 - 169/110)  BP(mean): --  RR: 18 (2021 13:00) (18 - 20)    PHYSICAL EXAM:  GENERAL: NAD, skinny   HEAD:  Atraumatic, Normocephalic  EYES: EOMI, PERRLA, conjunctiva and sclera clear  ENMT: No tonsillar erythema, exudates, or enlargement; Moist mucous membranes, Good dentition, No lesions  NECK: Supple, No JVD, Normal thyroid  NERVOUS SYSTEM:  Alert & Oriented X3, Good concentration; Motor Strength 5/5 B/L upper and lower extremities; DTRs 2+ intact and symmetric  CHEST/LUNG: Clear to percussion bilaterally; No rales, rhonchi, wheezing, or rubs  HEART: Regular rate and rhythm; No murmurs, rubs, or gallops  ABDOMEN: Soft, distended, diffuse tenderness noted on deep palpation, no rebound, no guarding, bowel sounds present  EXTREMITIES:  2+ Peripheral Pulses, No clubbing, cyanosis, or edema  LYMPH: No lymphadenopathy noted  SKIN: No rashes or lesions      LABS:                        13.1   3.53  )-----------( 95       ( 2021 09:14 )             38.8     04-    139  |  101  |  7<L>  ----------------------------<  81  3.9   |  23  |  0.7    Ca    8.7      2021 09:14  Mg     1.0     -18    TPro  6.3  /  Alb  3.4<L>  /  TBili  0.8  /  DBili  x   /  AST  46<H>  /  ALT  27  /  AlkPhos  107  04-18    PT/INR - ( 2021 18:02 )   PT: 12.60 sec;   INR: 1.10 ratio         PTT - ( 2021 18:02 )  PTT:33.4 sec  Urinalysis Basic - ( 2021 18:02 )    Color: Yellow / Appearance: Slightly Turbid / S.025 / pH: x  Gluc: x / Ketone: Small  / Bili: Small / Urobili: 3 mg/dL   Blood: x / Protein: 100 mg/dL / Nitrite: Negative   Leuk Esterase: Negative / RBC: 4 /HPF / WBC 2 /HPF   Sq Epi: x / Non Sq Epi: 0 /HPF / Bacteria: Negative    Culture - Urine (collected 2021 18:02)  Source: .Urine Clean Catch (Midstream)  Final Report (2021 01:26):    No growth    RADIOLOGY & ADDITIONAL tests:     < from: US Abdomen Limited (21 @ 00:16) >    IMPRESSION:    Hepatic steatosis.  Pancreas obscured by overlying bowel gas.  Otherwise unremarkable right upper quadrant ultrasound.    < end of copied text >  < from: CT Abdomen and Pelvis w/ IV Cont (21 @ 21:37) >  IMPRESSION:    No evidence for acute pulmonary embolus.    MEDICATIONS  (STANDING):  amLODIPine   Tablet 5 milliGRAM(s) Oral daily  chlorhexidine 4% Liquid 1 Application(s) Topical <User Schedule>  folic acid 1 milliGRAM(s) Oral daily  lactated ringers. 1000 milliLiter(s) (150 mL/Hr) IV Continuous <Continuous>  magnesium sulfate  IVPB 2 Gram(s) IV Intermittent every 2 hours  multivitamin 1 Tablet(s) Oral daily  pantoprazole    Tablet 40 milliGRAM(s) Oral before breakfast  rivaroxaban 20 milliGRAM(s) Oral with dinner    MEDICATIONS  (PRN):  LORazepam   Injectable 2 milliGRAM(s) IV Push every 2 hours PRN Symptom-triggered: 2 point increase in CIWA -Ar score and a total score of 7 or LESS  morphine  - Injectable 2 milliGRAM(s) IV Push every 4 hours PRN Severe Pain (7 - 10)  oxycodone    5 mG/acetaminophen 325 mG 1 Tablet(s) Oral every 6 hours PRN Moderate Pain (4 - 6)

## 2021-04-19 LAB
ALBUMIN SERPL ELPH-MCNC: 3.3 G/DL — LOW (ref 3.5–5.2)
ALBUMIN SERPL ELPH-MCNC: 3.3 G/DL — LOW (ref 3.5–5.2)
ALP SERPL-CCNC: 106 U/L — SIGNIFICANT CHANGE UP (ref 30–115)
ALP SERPL-CCNC: 112 U/L — SIGNIFICANT CHANGE UP (ref 30–115)
ALT FLD-CCNC: 24 U/L — SIGNIFICANT CHANGE UP (ref 0–41)
ALT FLD-CCNC: 26 U/L — SIGNIFICANT CHANGE UP (ref 0–41)
AMYLASE P1 CFR SERPL: 39 U/L — SIGNIFICANT CHANGE UP (ref 25–115)
ANION GAP SERPL CALC-SCNC: 11 MMOL/L — SIGNIFICANT CHANGE UP (ref 7–14)
ANION GAP SERPL CALC-SCNC: 11 MMOL/L — SIGNIFICANT CHANGE UP (ref 7–14)
AST SERPL-CCNC: 44 U/L — HIGH (ref 0–41)
AST SERPL-CCNC: 46 U/L — HIGH (ref 0–41)
BILIRUB DIRECT SERPL-MCNC: 0.3 MG/DL — HIGH (ref 0–0.2)
BILIRUB DIRECT SERPL-MCNC: 0.3 MG/DL — HIGH (ref 0–0.2)
BILIRUB INDIRECT FLD-MCNC: 0.3 MG/DL — SIGNIFICANT CHANGE UP (ref 0.2–1.2)
BILIRUB INDIRECT FLD-MCNC: 0.4 MG/DL — SIGNIFICANT CHANGE UP (ref 0.2–1.2)
BILIRUB SERPL-MCNC: 0.6 MG/DL — SIGNIFICANT CHANGE UP (ref 0.2–1.2)
BILIRUB SERPL-MCNC: 0.7 MG/DL — SIGNIFICANT CHANGE UP (ref 0.2–1.2)
BUN SERPL-MCNC: 4 MG/DL — LOW (ref 10–20)
BUN SERPL-MCNC: 5 MG/DL — LOW (ref 10–20)
CALCIUM SERPL-MCNC: 8.5 MG/DL — SIGNIFICANT CHANGE UP (ref 8.5–10.1)
CALCIUM SERPL-MCNC: 8.6 MG/DL — SIGNIFICANT CHANGE UP (ref 8.5–10.1)
CHLORIDE SERPL-SCNC: 98 MMOL/L — SIGNIFICANT CHANGE UP (ref 98–110)
CHLORIDE SERPL-SCNC: 99 MMOL/L — SIGNIFICANT CHANGE UP (ref 98–110)
CO2 SERPL-SCNC: 26 MMOL/L — SIGNIFICANT CHANGE UP (ref 17–32)
CO2 SERPL-SCNC: 27 MMOL/L — SIGNIFICANT CHANGE UP (ref 17–32)
COVID-19 SPIKE DOMAIN AB INTERP: NEGATIVE — SIGNIFICANT CHANGE UP
COVID-19 SPIKE DOMAIN ANTIBODY RESULT: 0.4 U/ML — SIGNIFICANT CHANGE UP
CREAT SERPL-MCNC: 0.7 MG/DL — SIGNIFICANT CHANGE UP (ref 0.7–1.5)
CREAT SERPL-MCNC: 0.7 MG/DL — SIGNIFICANT CHANGE UP (ref 0.7–1.5)
GLUCOSE SERPL-MCNC: 169 MG/DL — HIGH (ref 70–99)
GLUCOSE SERPL-MCNC: 77 MG/DL — SIGNIFICANT CHANGE UP (ref 70–99)
HCT VFR BLD CALC: 39.8 % — LOW (ref 42–52)
HGB BLD-MCNC: 13.4 G/DL — LOW (ref 14–18)
LIDOCAIN IGE QN: 9 U/L — SIGNIFICANT CHANGE UP (ref 7–60)
MAGNESIUM SERPL-MCNC: 1.7 MG/DL — LOW (ref 1.8–2.4)
MAGNESIUM SERPL-MCNC: 1.9 MG/DL — SIGNIFICANT CHANGE UP (ref 1.8–2.4)
MCHC RBC-ENTMCNC: 29.1 PG — SIGNIFICANT CHANGE UP (ref 27–31)
MCHC RBC-ENTMCNC: 33.7 G/DL — SIGNIFICANT CHANGE UP (ref 32–37)
MCV RBC AUTO: 86.3 FL — SIGNIFICANT CHANGE UP (ref 80–94)
NRBC # BLD: 0 /100 WBCS — SIGNIFICANT CHANGE UP (ref 0–0)
PHOSPHATE SERPL-MCNC: 3.3 MG/DL — SIGNIFICANT CHANGE UP (ref 2.1–4.9)
PLATELET # BLD AUTO: 99 K/UL — LOW (ref 130–400)
POTASSIUM SERPL-MCNC: 3.4 MMOL/L — LOW (ref 3.5–5)
POTASSIUM SERPL-MCNC: 3.6 MMOL/L — SIGNIFICANT CHANGE UP (ref 3.5–5)
POTASSIUM SERPL-SCNC: 3.4 MMOL/L — LOW (ref 3.5–5)
POTASSIUM SERPL-SCNC: 3.6 MMOL/L — SIGNIFICANT CHANGE UP (ref 3.5–5)
PROT SERPL-MCNC: 6.5 G/DL — SIGNIFICANT CHANGE UP (ref 6–8)
PROT SERPL-MCNC: 6.6 G/DL — SIGNIFICANT CHANGE UP (ref 6–8)
RBC # BLD: 4.61 M/UL — LOW (ref 4.7–6.1)
RBC # FLD: 12.6 % — SIGNIFICANT CHANGE UP (ref 11.5–14.5)
SARS-COV-2 IGG+IGM SERPL QL IA: 0.4 U/ML — SIGNIFICANT CHANGE UP
SARS-COV-2 IGG+IGM SERPL QL IA: NEGATIVE — SIGNIFICANT CHANGE UP
SODIUM SERPL-SCNC: 136 MMOL/L — SIGNIFICANT CHANGE UP (ref 135–146)
SODIUM SERPL-SCNC: 136 MMOL/L — SIGNIFICANT CHANGE UP (ref 135–146)
WBC # BLD: 3.63 K/UL — LOW (ref 4.8–10.8)
WBC # FLD AUTO: 3.63 K/UL — LOW (ref 4.8–10.8)

## 2021-04-19 PROCEDURE — 93010 ELECTROCARDIOGRAM REPORT: CPT

## 2021-04-19 PROCEDURE — 93970 EXTREMITY STUDY: CPT | Mod: 26

## 2021-04-19 PROCEDURE — 99232 SBSQ HOSP IP/OBS MODERATE 35: CPT

## 2021-04-19 RX ORDER — MAGNESIUM SULFATE 500 MG/ML
2 VIAL (ML) INJECTION ONCE
Refills: 0 | Status: COMPLETED | OUTPATIENT
Start: 2021-04-19 | End: 2021-04-19

## 2021-04-19 RX ORDER — SENNA PLUS 8.6 MG/1
2 TABLET ORAL AT BEDTIME
Refills: 0 | Status: DISCONTINUED | OUTPATIENT
Start: 2021-04-19 | End: 2021-04-26

## 2021-04-19 RX ORDER — LACTULOSE 10 G/15ML
10 SOLUTION ORAL
Refills: 0 | Status: DISCONTINUED | OUTPATIENT
Start: 2021-04-19 | End: 2021-04-26

## 2021-04-19 RX ORDER — POTASSIUM CHLORIDE 20 MEQ
20 PACKET (EA) ORAL
Refills: 0 | Status: COMPLETED | OUTPATIENT
Start: 2021-04-19 | End: 2021-04-19

## 2021-04-19 RX ORDER — POLYETHYLENE GLYCOL 3350 17 G/17G
17 POWDER, FOR SOLUTION ORAL DAILY
Refills: 0 | Status: DISCONTINUED | OUTPATIENT
Start: 2021-04-19 | End: 2021-04-26

## 2021-04-19 RX ADMIN — CHLORHEXIDINE GLUCONATE 1 APPLICATION(S): 213 SOLUTION TOPICAL at 05:17

## 2021-04-19 RX ADMIN — SODIUM CHLORIDE 150 MILLILITER(S): 9 INJECTION, SOLUTION INTRAVENOUS at 00:32

## 2021-04-19 RX ADMIN — AMLODIPINE BESYLATE 5 MILLIGRAM(S): 2.5 TABLET ORAL at 05:17

## 2021-04-19 RX ADMIN — MORPHINE SULFATE 2 MILLIGRAM(S): 50 CAPSULE, EXTENDED RELEASE ORAL at 11:32

## 2021-04-19 RX ADMIN — Medication 1 TABLET(S): at 11:23

## 2021-04-19 RX ADMIN — POLYETHYLENE GLYCOL 3350 17 GRAM(S): 17 POWDER, FOR SOLUTION ORAL at 11:23

## 2021-04-19 RX ADMIN — Medication 2 MILLIGRAM(S): at 07:26

## 2021-04-19 RX ADMIN — RIVAROXABAN 20 MILLIGRAM(S): KIT at 17:12

## 2021-04-19 RX ADMIN — MORPHINE SULFATE 2 MILLIGRAM(S): 50 CAPSULE, EXTENDED RELEASE ORAL at 14:49

## 2021-04-19 RX ADMIN — LACTULOSE 10 GRAM(S): 10 SOLUTION ORAL at 17:12

## 2021-04-19 RX ADMIN — PANTOPRAZOLE SODIUM 40 MILLIGRAM(S): 20 TABLET, DELAYED RELEASE ORAL at 05:17

## 2021-04-19 RX ADMIN — Medication 10 MILLIGRAM(S): at 17:12

## 2021-04-19 RX ADMIN — Medication 2 MILLIGRAM(S): at 19:51

## 2021-04-19 RX ADMIN — Medication 25 GRAM(S): at 16:20

## 2021-04-19 RX ADMIN — MORPHINE SULFATE 2 MILLIGRAM(S): 50 CAPSULE, EXTENDED RELEASE ORAL at 04:53

## 2021-04-19 RX ADMIN — Medication 10 MILLIGRAM(S): at 11:23

## 2021-04-19 RX ADMIN — MORPHINE SULFATE 2 MILLIGRAM(S): 50 CAPSULE, EXTENDED RELEASE ORAL at 19:50

## 2021-04-19 RX ADMIN — Medication 50 MILLIEQUIVALENT(S): at 16:20

## 2021-04-19 RX ADMIN — Medication 1 MILLIGRAM(S): at 11:23

## 2021-04-19 RX ADMIN — SENNA PLUS 2 TABLET(S): 8.6 TABLET ORAL at 22:04

## 2021-04-19 NOTE — PROGRESS NOTE ADULT - SUBJECTIVE AND OBJECTIVE BOX
Patient is a 60y old  Male who presents with a chief complaint of abdominal pain, was diagnosed with acute recurrent alcohol induced pancreatitis, pt was started on IV hydration, pain meds PRN, kept NPO. Addiction medicine was consulted.  Today pt is c/o constipation and diffuse abdominal pain, does not report any improvement, was tremolos this morning, improved after Ativan.       Vital Signs Last 24 Hrs  T(C): 36.2 (2021 05:14), Max: 36.2 (2021 05:14)  T(F): 97.2 (2021 05:14), Max: 97.2 (2021 05:14)  HR: 78 (2021 06:49) (72 - 78)  BP: 148/87 (2021 06:49) (146/100 - 172/103)  BP(mean): --  RR: 18 (2021 05:14) (18 - 18)  SpO2: --    PHYSICAL EXAM:  GENERAL: NAD, skinny   HEAD:  Atraumatic, Normocephalic  EYES: EOMI, PERRLA, conjunctiva and sclera clear  ENMT: No tonsillar erythema, exudates, or enlargement; Moist mucous membranes, Good dentition, No lesions  NECK: Supple, No JVD, Normal thyroid  NERVOUS SYSTEM:  Alert & Oriented X3, Good concentration; Motor Strength 5/5 B/L upper and lower extremities; DTRs 2+ intact and symmetric  CHEST/LUNG: Clear to percussion bilaterally; No rales, rhonchi, wheezing, or rubs  HEART: Regular rate and rhythm; No murmurs, rubs, or gallops  ABDOMEN: Soft, distended, diffuse tenderness noted on deep palpation, no rebound, no guarding, bowel sounds present  EXTREMITIES:  2+ Peripheral Pulses, No clubbing, cyanosis, or edema  LYMPH: No lymphadenopathy noted  SKIN: No rashes or lesions      LABS:                                   13.4   3.63  )-----------( 99       ( 2021 06:54 )             39.8   04-19    136  |  98  |  5<L>  ----------------------------<  77  3.6   |  27  |  0.7    Ca    8.6      2021 06:54  Mg     1.9     04-19    TPro  6.5  /  Alb  3.3<L>  /  TBili  0.7  /  DBili  0.3<H>  /  AST  44<H>  /  ALT  24  /  AlkPhos  112        PT/INR - ( 2021 18:02 )   PT: 12.60 sec;   INR: 1.10 ratio         PTT - ( 2021 18:02 )  PTT:33.4 sec  Urinalysis Basic - ( 2021 18:02 )    Color: Yellow / Appearance: Slightly Turbid / S.025 / pH: x  Gluc: x / Ketone: Small  / Bili: Small / Urobili: 3 mg/dL   Blood: x / Protein: 100 mg/dL / Nitrite: Negative   Leuk Esterase: Negative / RBC: 4 /HPF / WBC 2 /HPF   Sq Epi: x / Non Sq Epi: 0 /HPF / Bacteria: Negative    Culture - Urine (collected 2021 18:02)  Source: .Urine Clean Catch (Midstream)  Final Report (2021 01:26):    No growth    RADIOLOGY & ADDITIONAL tests:     < from: US Abdomen Limited (21 @ 00:16) >    IMPRESSION:    Hepatic steatosis.  Pancreas obscured by overlying bowel gas.  Otherwise unremarkable right upper quadrant ultrasound.    < end of copied text >  < from: CT Abdomen and Pelvis w/ IV Cont (21 @ 21:37) >  IMPRESSION:    No evidence for acute pulmonary embolus.    MEDICATIONS  (STANDING):  amLODIPine   Tablet 5 milliGRAM(s) Oral daily  chlorhexidine 4% Liquid 1 Application(s) Topical <User Schedule>  dicyclomine 10 milliGRAM(s) Oral three times a day before meals  folic acid 1 milliGRAM(s) Oral daily  lactated ringers. 1000 milliLiter(s) (150 mL/Hr) IV Continuous <Continuous>  lactulose Syrup 10 Gram(s) Oral two times a day  multivitamin 1 Tablet(s) Oral daily  pantoprazole    Tablet 40 milliGRAM(s) Oral before breakfast  polyethylene glycol 3350 17 Gram(s) Oral daily  rivaroxaban 20 milliGRAM(s) Oral with dinner  senna 2 Tablet(s) Oral at bedtime    MEDICATIONS  (PRN):  LORazepam   Injectable 2 milliGRAM(s) IV Push every 2 hours PRN CIWA score 8-15  LORazepam   Injectable 4 milliGRAM(s) IV Push every 1 hour PRN CIWA score greater than 15  morphine  - Injectable 2 milliGRAM(s) IV Push every 4 hours PRN Severe Pain (7 - 10)  oxycodone    5 mG/acetaminophen 325 mG 1 Tablet(s) Oral every 6 hours PRN Moderate Pain (4 - 6)

## 2021-04-19 NOTE — PROGRESS NOTE ADULT - SUBJECTIVE AND OBJECTIVE BOX
YESSICA BECKFORD 60y Male  MRN#: [id number]   Hospital Day: 2d    SUBJECTIVE  Patient is a 60y old Male who presents with a chief complaint of abdominal pain (19 Apr 2021 13:51)  Currently admitted to medicine with the primary diagnosis of Pancreatitis      INTERVAL HPI AND OVERNIGHT EVENTS:  Patient was examined and seen at bedside. This morning he is resting comfortably in bed.    REVIEW OF SYMPTOMS:  +constipation, abdominal pain. denies any n/v/d, cp, sob.  +ve tremulousness and feelings of anxiety with respect to not drinking    OBJECTIVE  PAST MEDICAL & SURGICAL HISTORY  DVT (deep venous thrombosis)    HTN (hypertension)    EtOH dependence    H/O chronic pancreatitis      ALLERGIES:  No Known Allergies    MEDICATIONS:  STANDING MEDICATIONS  amLODIPine   Tablet 5 milliGRAM(s) Oral daily  chlorhexidine 4% Liquid 1 Application(s) Topical <User Schedule>  dicyclomine 10 milliGRAM(s) Oral three times a day before meals  folic acid 1 milliGRAM(s) Oral daily  lactated ringers. 1000 milliLiter(s) IV Continuous <Continuous>  lactulose Syrup 10 Gram(s) Oral two times a day  magnesium sulfate  IVPB 2 Gram(s) IV Intermittent once  multivitamin 1 Tablet(s) Oral daily  pantoprazole    Tablet 40 milliGRAM(s) Oral before breakfast  polyethylene glycol 3350 17 Gram(s) Oral daily  potassium chloride  20 mEq/100 mL IVPB 20 milliEquivalent(s) IV Intermittent every 2 hours  rivaroxaban 20 milliGRAM(s) Oral with dinner  senna 2 Tablet(s) Oral at bedtime    PRN MEDICATIONS  LORazepam   Injectable 2 milliGRAM(s) IV Push every 2 hours PRN  LORazepam   Injectable 4 milliGRAM(s) IV Push every 1 hour PRN  morphine  - Injectable 2 milliGRAM(s) IV Push every 4 hours PRN  oxycodone    5 mG/acetaminophen 325 mG 1 Tablet(s) Oral every 6 hours PRN      VITAL SIGNS: Last 24 Hours  T(C): 36.8 (19 Apr 2021 13:54), Max: 36.8 (19 Apr 2021 13:54)  T(F): 98.2 (19 Apr 2021 13:54), Max: 98.2 (19 Apr 2021 13:54)  HR: 84 (19 Apr 2021 13:54) (72 - 84)  BP: 158/102 (19 Apr 2021 13:54) (146/100 - 172/103)  BP(mean): --  RR: 18 (19 Apr 2021 13:54) (18 - 18)  SpO2: --    LABS:                        13.4   3.63  )-----------( 99       ( 19 Apr 2021 06:54 )             39.8     04-19    136  |  99  |  4<L>  ----------------------------<  169<H>  3.4<L>   |  26  |  0.7    Ca    8.5      19 Apr 2021 12:34  Phos  3.3     04-19  Mg     1.7     04-19    TPro  6.6  /  Alb  3.3<L>  /  TBili  0.6  /  DBili  0.3<H>  /  AST  46<H>  /  ALT  26  /  AlkPhos  106  04-19              Culture - Urine (collected 16 Apr 2021 18:02)  Source: .Urine Clean Catch (Midstream)  Final Report (18 Apr 2021 01:26):    No growth          RADIOLOGY:      PHYSICAL EXAM:  CONSTITUTIONAL: No acute distress, well-developed AAOx3  HEAD: Atraumatic, normocephalic  EYES: Sclera clear, no icterus, eomi  ENT: Supple  PULMONARY: Clear to auscultation bilaterally  CARDIOVASCULAR: Regular rate and rhythm;  GASTROINTESTINAL: Soft, diffusely tender, no rebound/guarding  MUSCULOSKELETAL: moves 4/4 extremities, no edema  NEUROLOGY: non-focal  SKIN: Intact    ASSESSMENT & PLAN  59 yo male with pmh of alcohol use disorder, HTN, DVT on Xarelto presents to the hospital with 2 day history of constant 10/10 non radiating anna-umbilical abdominal pain associated with nausea and nbnb vomiting. He reports he drinks about 5 pints of vodka / wine daily and his last drink was yesterday which resulted in vomiting. The patient notes that he has been drinking for many years, he  was diagnosed with acute recurrent alcohol induced pancreatitis, pt was started on IV hydration, pain meds PRN, kept NPO. Addiction medicine was consulted.    # Acute on chronic Pancreatitis 2/2 chronic EtOH abuse  - c/w  IV hydration at 150cc/h  - pain medications PRN   - had been on clear liquid diet, tolerating--will advance to soft for dinner  - Addiction medicine following for possible rehab placement    # Constipation   - start Senna, Miralax, Lactulose   - Bentyl 10 mg TID     # Alcoholism / Etoh induced liver injury   - RUQ +ve for hepatic steatosis  - CIWA score 2 in AM  - ativan PRN   - catch team follow up   - c/w folic acid and multivitamin  - monitor and replete electrolytes--K and Mg low in AM--repletion ordered ,  - monitor LFTS  - avoid hepatotoxic medications     # h/o HTN  - c/w amlodipine     # Thrombocytopenia   - likely due to liver disease  - monitor platelets     # h/o b/l DVT   -  on Xarelto  - repeat LE Doppler -ve for any DVT--will consider dc'ing AC as pt high risk d/t thrombocytopenia 2/2 chronic liver dz  - pt has a high risk for AC ( heavy drinker)     MISC:  # DVT PPX: Xarelto  # GI PPX: Protonix  # Activity: as tolerated  # Diet--Soft--AAT  # FULL CODE      PAST MEDICAL & SURGICAL HISTORY:  DVT (deep venous thrombosis)    HTN (hypertension)    EtOH dependence    H/O chronic pancreatitis

## 2021-04-19 NOTE — PROGRESS NOTE ADULT - ASSESSMENT
61 yo male with pmh of alcohol use disorder, HTN, DVT on Xarelto presents to the hospital with 2 day history of constant 10/10 non radiating anna-umbilical abdominal pain associated with nausea and nbnb vomiting. He reports he drinks about 5 pints of vodka / wine daily and his last drink was yesterday which resulted in vomiting. The patient notes that he has been drinking for many years, he  was diagnosed with acute recurrent alcohol induced pancreatitis, pt was started on IV hydration, pain meds PRN, kept NPO. Addiction medicine was consulted.    A/P     # Acute on chronic Etoh induced  pancreatitis  - c/w  IV hydration   - pain medications PRN   - started on clear liquid diet, will advance slow as tolerated   - he was consulted regarding Etoh abstinence    # Constipation   - start Senna, Miralax, Lactulose   - Bentyl 10 mg TID     # Alcoholism / Etoh induced liver injury   - pt was consulted by abstinence   - ciwa protocol, start Ativan taper protocol   - catch team follow up    - c/w folic acid and start multivitamin  - monitor and replete electrolytes ,  - monitor LFTS  - RUQ US noted   - avoid hepatotoxic medications     # h/o HTN  - DASH diet   - on amlodipine     # Thrombocytopenia   - likely due to liver Dz  - monitor platelets     # h/o b/l DVT   -  on Xarelto  - repeat LE Doppler   - pt has a high risk for AC ( heavy drinker)     # DVT PPX: Xarelto  # GI PPX: PPI  # Activity: as tolerated    #Progress Note Handoff  Pending (specify):  c/w clear liquid diet, advance as tolerated, c/w IV hydration, CATCH team follow up, start Ativan taper,  get LE Doppler, start Miralax, Senna, Lactulose for constipation   Family discussion: I spoke with pt, he agreed with a plan of care   Disposition: Home_x __/SNF___/Other________/Unknown at this time________ 59 yo male with pmh of alcohol use disorder, HTN, DVT on Xarelto presents to the hospital with 2 day history of constant 10/10 non radiating anna-umbilical abdominal pain associated with nausea and nbnb vomiting. He reports he drinks about 5 pints of vodka / wine daily and his last drink was yesterday which resulted in vomiting. The patient notes that he has been drinking for many years, he  was diagnosed with acute recurrent alcohol induced pancreatitis, pt was started on IV hydration, pain meds PRN, kept NPO. Addiction medicine was consulted.    A/P     # Acute on chronic Etoh induced  pancreatitis  - c/w  IV hydration   - pain medications PRN   - started on clear liquid diet, will advance slow as tolerated   - he was consulted regarding Etoh abstinence    # Constipation   - start Senna, Miralax, Lactulose   - Bentyl 10 mg TID     # Alcoholism / Etoh induced liver injury   - pt was consulted by abstinence   - [facility] protocol  - catch team follow up    - c/w folic acid and start multivitamin  - monitor and replete electrolytes ,  - monitor LFTS  - RUQ US noted   - avoid hepatotoxic medications     # h/o HTN  - DASH diet   - on amlodipine     # Thrombocytopenia   - likely due to liver Dz  - monitor platelets     # h/o b/l DVT   -  on Xarelto  - repeat LE Doppler   - pt has a high risk for AC ( heavy drinker)     # DVT PPX: Xarelto  # GI PPX: PPI  # Activity: as tolerated    #Progress Note Handoff  Pending (specify):  c/w clear liquid diet, advance as tolerated, c/w IV hydration, CATCH team follow up, start Ativan taper,  get LE Doppler, start Miralax, Senna, Lactulose for constipation   Family discussion: I spoke with pt, he agreed with a plan of care   Disposition: Home_x __/SNF___/Other________/Unknown at this time________

## 2021-04-20 LAB
ALBUMIN SERPL ELPH-MCNC: 3.5 G/DL — SIGNIFICANT CHANGE UP (ref 3.5–5.2)
ALP SERPL-CCNC: 109 U/L — SIGNIFICANT CHANGE UP (ref 30–115)
ALT FLD-CCNC: 26 U/L — SIGNIFICANT CHANGE UP (ref 0–41)
ANION GAP SERPL CALC-SCNC: 11 MMOL/L — SIGNIFICANT CHANGE UP (ref 7–14)
AST SERPL-CCNC: 45 U/L — HIGH (ref 0–41)
BILIRUB SERPL-MCNC: 0.5 MG/DL — SIGNIFICANT CHANGE UP (ref 0.2–1.2)
BUN SERPL-MCNC: 5 MG/DL — LOW (ref 10–20)
CALCIUM SERPL-MCNC: 9 MG/DL — SIGNIFICANT CHANGE UP (ref 8.5–10.1)
CHLORIDE SERPL-SCNC: 101 MMOL/L — SIGNIFICANT CHANGE UP (ref 98–110)
CO2 SERPL-SCNC: 26 MMOL/L — SIGNIFICANT CHANGE UP (ref 17–32)
CREAT SERPL-MCNC: 0.8 MG/DL — SIGNIFICANT CHANGE UP (ref 0.7–1.5)
GLUCOSE SERPL-MCNC: 121 MG/DL — HIGH (ref 70–99)
HCT VFR BLD CALC: 41 % — LOW (ref 42–52)
HGB BLD-MCNC: 13.9 G/DL — LOW (ref 14–18)
MAGNESIUM SERPL-MCNC: 1.9 MG/DL — SIGNIFICANT CHANGE UP (ref 1.8–2.4)
MCHC RBC-ENTMCNC: 29.4 PG — SIGNIFICANT CHANGE UP (ref 27–31)
MCHC RBC-ENTMCNC: 33.9 G/DL — SIGNIFICANT CHANGE UP (ref 32–37)
MCV RBC AUTO: 86.9 FL — SIGNIFICANT CHANGE UP (ref 80–94)
NRBC # BLD: 0 /100 WBCS — SIGNIFICANT CHANGE UP (ref 0–0)
PLATELET # BLD AUTO: 114 K/UL — LOW (ref 130–400)
POTASSIUM SERPL-MCNC: 3.9 MMOL/L — SIGNIFICANT CHANGE UP (ref 3.5–5)
POTASSIUM SERPL-SCNC: 3.9 MMOL/L — SIGNIFICANT CHANGE UP (ref 3.5–5)
PROT SERPL-MCNC: 6.9 G/DL — SIGNIFICANT CHANGE UP (ref 6–8)
RBC # BLD: 4.72 M/UL — SIGNIFICANT CHANGE UP (ref 4.7–6.1)
RBC # FLD: 12.5 % — SIGNIFICANT CHANGE UP (ref 11.5–14.5)
SODIUM SERPL-SCNC: 138 MMOL/L — SIGNIFICANT CHANGE UP (ref 135–146)
WBC # BLD: 4.04 K/UL — LOW (ref 4.8–10.8)
WBC # FLD AUTO: 4.04 K/UL — LOW (ref 4.8–10.8)

## 2021-04-20 PROCEDURE — 99231 SBSQ HOSP IP/OBS SF/LOW 25: CPT

## 2021-04-20 RX ORDER — SUCRALFATE 1 G
1 TABLET ORAL
Refills: 0 | Status: DISCONTINUED | OUTPATIENT
Start: 2021-04-20 | End: 2021-04-21

## 2021-04-20 RX ADMIN — OXYCODONE AND ACETAMINOPHEN 1 TABLET(S): 5; 325 TABLET ORAL at 14:19

## 2021-04-20 RX ADMIN — MORPHINE SULFATE 2 MILLIGRAM(S): 50 CAPSULE, EXTENDED RELEASE ORAL at 08:07

## 2021-04-20 RX ADMIN — Medication 10 MILLIGRAM(S): at 11:22

## 2021-04-20 RX ADMIN — MORPHINE SULFATE 2 MILLIGRAM(S): 50 CAPSULE, EXTENDED RELEASE ORAL at 17:40

## 2021-04-20 RX ADMIN — MORPHINE SULFATE 2 MILLIGRAM(S): 50 CAPSULE, EXTENDED RELEASE ORAL at 13:37

## 2021-04-20 RX ADMIN — OXYCODONE AND ACETAMINOPHEN 1 TABLET(S): 5; 325 TABLET ORAL at 14:49

## 2021-04-20 RX ADMIN — Medication 10 MILLIGRAM(S): at 05:25

## 2021-04-20 RX ADMIN — MORPHINE SULFATE 2 MILLIGRAM(S): 50 CAPSULE, EXTENDED RELEASE ORAL at 21:30

## 2021-04-20 RX ADMIN — SENNA PLUS 2 TABLET(S): 8.6 TABLET ORAL at 21:15

## 2021-04-20 RX ADMIN — RIVAROXABAN 20 MILLIGRAM(S): KIT at 17:40

## 2021-04-20 RX ADMIN — Medication 1 MILLIGRAM(S): at 11:22

## 2021-04-20 RX ADMIN — PANTOPRAZOLE SODIUM 40 MILLIGRAM(S): 20 TABLET, DELAYED RELEASE ORAL at 05:25

## 2021-04-20 RX ADMIN — AMLODIPINE BESYLATE 5 MILLIGRAM(S): 2.5 TABLET ORAL at 05:25

## 2021-04-20 RX ADMIN — MORPHINE SULFATE 2 MILLIGRAM(S): 50 CAPSULE, EXTENDED RELEASE ORAL at 13:52

## 2021-04-20 RX ADMIN — CHLORHEXIDINE GLUCONATE 1 APPLICATION(S): 213 SOLUTION TOPICAL at 05:24

## 2021-04-20 RX ADMIN — Medication 1 TABLET(S): at 11:22

## 2021-04-20 RX ADMIN — Medication 10 MILLIGRAM(S): at 17:40

## 2021-04-20 RX ADMIN — MORPHINE SULFATE 2 MILLIGRAM(S): 50 CAPSULE, EXTENDED RELEASE ORAL at 21:34

## 2021-04-20 RX ADMIN — MORPHINE SULFATE 2 MILLIGRAM(S): 50 CAPSULE, EXTENDED RELEASE ORAL at 17:55

## 2021-04-20 RX ADMIN — LACTULOSE 10 GRAM(S): 10 SOLUTION ORAL at 05:24

## 2021-04-20 RX ADMIN — Medication 1 GRAM(S): at 11:22

## 2021-04-20 RX ADMIN — Medication 1 GRAM(S): at 17:40

## 2021-04-20 RX ADMIN — POLYETHYLENE GLYCOL 3350 17 GRAM(S): 17 POWDER, FOR SOLUTION ORAL at 11:22

## 2021-04-20 RX ADMIN — MORPHINE SULFATE 2 MILLIGRAM(S): 50 CAPSULE, EXTENDED RELEASE ORAL at 07:53

## 2021-04-20 NOTE — PROGRESS NOTE ADULT - SUBJECTIVE AND OBJECTIVE BOX
YESSICA BECKFORD 60y Male  MRN#: [id number]   Hospital Day: 3d    SUBJECTIVE  Patient is a 60y old Male who presents with a chief complaint of abdominal pain (19 Apr 2021 15:44)  Currently admitted to medicine with the primary diagnosis of Pancreatitis      INTERVAL HPI AND OVERNIGHT EVENTS:  Patient was examined and seen at bedside. This morning he is resting comfortably in bed.  Pt c/o abd pain in AM that began after dinner yesterday (had been advanced to soft after lunch)    REVIEW OF SYMPTOMS:  +abdominal pain. denies any n/v/d/c  denies any CP or SOB    OBJECTIVE  PAST MEDICAL & SURGICAL HISTORY  DVT (deep venous thrombosis)    HTN (hypertension)    EtOH dependence    H/O chronic pancreatitis      ALLERGIES:  No Known Allergies    MEDICATIONS:  STANDING MEDICATIONS  amLODIPine   Tablet 5 milliGRAM(s) Oral daily  chlorhexidine 4% Liquid 1 Application(s) Topical <User Schedule>  dicyclomine 10 milliGRAM(s) Oral three times a day before meals  folic acid 1 milliGRAM(s) Oral daily  lactated ringers. 1000 milliLiter(s) IV Continuous <Continuous>  lactulose Syrup 10 Gram(s) Oral two times a day  multivitamin 1 Tablet(s) Oral daily  pantoprazole    Tablet 40 milliGRAM(s) Oral before breakfast  polyethylene glycol 3350 17 Gram(s) Oral daily  rivaroxaban 20 milliGRAM(s) Oral with dinner  senna 2 Tablet(s) Oral at bedtime  sucralfate 1 Gram(s) Oral two times a day    PRN MEDICATIONS  LORazepam   Injectable 2 milliGRAM(s) IV Push every 2 hours PRN  LORazepam   Injectable 4 milliGRAM(s) IV Push every 1 hour PRN  morphine  - Injectable 2 milliGRAM(s) IV Push every 4 hours PRN  oxycodone    5 mG/acetaminophen 325 mG 1 Tablet(s) Oral every 6 hours PRN      VITAL SIGNS: Last 24 Hours  T(C): 36.2 (20 Apr 2021 05:46), Max: 36.8 (19 Apr 2021 13:54)  T(F): 97.1 (20 Apr 2021 05:46), Max: 98.2 (19 Apr 2021 13:54)  HR: 71 (20 Apr 2021 09:51) (71 - 85)  BP: 157/98 (20 Apr 2021 09:51) (145/92 - 177/101)  BP(mean): --  RR: 18 (20 Apr 2021 05:46) (18 - 18)  SpO2: --    LABS:                        13.9   4.04  )-----------( 114      ( 20 Apr 2021 07:17 )             41.0     04-20    138  |  101  |  5<L>  ----------------------------<  121<H>  3.9   |  26  |  0.8    Ca    9.0      20 Apr 2021 07:17  Phos  3.3     04-19  Mg     1.9     04-20    TPro  6.9  /  Alb  3.5  /  TBili  0.5  /  DBili  x   /  AST  45<H>  /  ALT  26  /  AlkPhos  109  04-20                  RADIOLOGY:      PHYSICAL EXAM:  CONSTITUTIONAL: No acute distress, well-developed AAOx3  HEAD: Atraumatic, normocephalic  EYES: Sclera clear, no icterus, eomi  ENT: Supple  PULMONARY: Clear to auscultation bilaterally  CARDIOVASCULAR: Regular rate and rhythm;  GASTROINTESTINAL: Soft, diffusely tender, no rebound/guarding  MUSCULOSKELETAL: moves 4/4 extremities, no edema  NEUROLOGY: non-focal  SKIN: Intact    ASSESSMENT & PLAN  61 yo male with pmh of alcohol use disorder, HTN, DVT on Xarelto presents to the hospital with 2 day history of constant 10/10 non radiating anna-umbilical abdominal pain associated with nausea and nbnb vomiting. He reports he drinks about 5 pints of vodka / wine daily and his last drink was yesterday which resulted in vomiting. The patient notes that he has been drinking for many years, he  was diagnosed with acute recurrent alcohol induced pancreatitis, pt was started on IV hydration, pain meds PRN, kept NPO. Addiction medicine was consulted.    # Acute on chronic Pancreatitis 2/2 chronic EtOH abuse  - c/w  IV hydration at 150cc/h  - pain medications PRN   - had been on clear liquid diet, advanced to soft yesterday but pain recurred--Return diet to full liquid  - Addiction medicine following for possible rehab placement  - Carafate 1g BID added for abd pain (possible element of erosive gastritis from chronic EtOH abuse)    # Constipation--resolved  - endorses having BM yesterday   - c/w Senna, Miralax, Lactulose   - Bentyl 10 mg TID     # Alcoholism / Etoh induced liver injury   - RUQ +ve for hepatic steatosis  - CIWA score 2 in AM  - ativan PRN   - catch team follow up   - c/w folic acid and multivitamin  - monitor and replete electrolytes ,  - monitor LFTS  - avoid hepatotoxic medications     # h/o HTN  - c/w amlodipine     # Thrombocytopenia   - likely due to liver disease  - monitor platelets     # h/o b/l DVT   - Pt states that he had L calf DVT 5 years ago provoked by immobilization and had R leg DVT 3.5 yrs ago again provoked by immobilization. Of note, pt was discharged after 1st DVT on xarelto and never changed after recurrence. He endorses compliance.  - on Xarelto  - repeat LE Doppler -ve for any DVT--will consider dc'ing AC as pt high risk d/t thrombocytopenia 2/2 chronic liver dz  - pt has a high risk for AC ( heavy drinker)     MISC:  # DVT PPX: Xarelto  # GI PPX: Protonix  # Activity: as tolerated  # Diet--Full Liquid--AAT  # FULL CODE          PAST MEDICAL & SURGICAL HISTORY:  DVT (deep venous thrombosis)    HTN (hypertension)    EtOH dependence    H/O chronic pancreatitis     YESSICA BECKFORD 60y Male  MRN#: [id number]   Hospital Day: 3d    SUBJECTIVE  Patient is a 60y old Male who presents with a chief complaint of abdominal pain (19 Apr 2021 15:44)  Currently admitted to medicine with the primary diagnosis of Pancreatitis      INTERVAL HPI AND OVERNIGHT EVENTS:  Patient was examined and seen at bedside. This morning he is resting comfortably in bed.  Pt c/o abd pain in AM that began after dinner yesterday (had been advanced to soft after lunch)    REVIEW OF SYMPTOMS:  +abdominal pain. denies any n/v/d/c  denies any CP or SOB    OBJECTIVE  PAST MEDICAL & SURGICAL HISTORY  DVT (deep venous thrombosis)    HTN (hypertension)    EtOH dependence    H/O chronic pancreatitis      ALLERGIES:  No Known Allergies    MEDICATIONS:  STANDING MEDICATIONS  amLODIPine   Tablet 5 milliGRAM(s) Oral daily  chlorhexidine 4% Liquid 1 Application(s) Topical <User Schedule>  dicyclomine 10 milliGRAM(s) Oral three times a day before meals  folic acid 1 milliGRAM(s) Oral daily  lactated ringers. 1000 milliLiter(s) IV Continuous <Continuous>  lactulose Syrup 10 Gram(s) Oral two times a day  multivitamin 1 Tablet(s) Oral daily  pantoprazole    Tablet 40 milliGRAM(s) Oral before breakfast  polyethylene glycol 3350 17 Gram(s) Oral daily  rivaroxaban 20 milliGRAM(s) Oral with dinner  senna 2 Tablet(s) Oral at bedtime  sucralfate 1 Gram(s) Oral two times a day    PRN MEDICATIONS  LORazepam   Injectable 2 milliGRAM(s) IV Push every 2 hours PRN  LORazepam   Injectable 4 milliGRAM(s) IV Push every 1 hour PRN  morphine  - Injectable 2 milliGRAM(s) IV Push every 4 hours PRN  oxycodone    5 mG/acetaminophen 325 mG 1 Tablet(s) Oral every 6 hours PRN      VITAL SIGNS: Last 24 Hours  T(C): 36.2 (20 Apr 2021 05:46), Max: 36.8 (19 Apr 2021 13:54)  T(F): 97.1 (20 Apr 2021 05:46), Max: 98.2 (19 Apr 2021 13:54)  HR: 71 (20 Apr 2021 09:51) (71 - 85)  BP: 157/98 (20 Apr 2021 09:51) (145/92 - 177/101)  BP(mean): --  RR: 18 (20 Apr 2021 05:46) (18 - 18)  SpO2: --    LABS:                        13.9   4.04  )-----------( 114      ( 20 Apr 2021 07:17 )             41.0     04-20    138  |  101  |  5<L>  ----------------------------<  121<H>  3.9   |  26  |  0.8    Ca    9.0      20 Apr 2021 07:17  Phos  3.3     04-19  Mg     1.9     04-20    TPro  6.9  /  Alb  3.5  /  TBili  0.5  /  DBili  x   /  AST  45<H>  /  ALT  26  /  AlkPhos  109  04-20      RADIOLOGY:      PHYSICAL EXAM:  CONSTITUTIONAL: No acute distress, well-developed AAOx3  HEAD: Atraumatic, normocephalic  EYES: Sclera clear, no icterus, eomi  ENT: Supple  PULMONARY: Clear to auscultation bilaterally  CARDIOVASCULAR: Regular rate and rhythm;  GASTROINTESTINAL: Soft, diffusely tender, no rebound/guarding  MUSCULOSKELETAL: moves 4/4 extremities, no edema  NEUROLOGY: non-focal  SKIN: Intact    ASSESSMENT & PLAN  61 yo male with pmh of alcohol use disorder, HTN, DVT on Xarelto presents to the hospital with 2 day history of constant 10/10 non radiating anna-umbilical abdominal pain associated with nausea and nbnb vomiting. He reports he drinks about 5 pints of vodka / wine daily and his last drink was yesterday which resulted in vomiting. The patient notes that he has been drinking for many years, he  was diagnosed with acute recurrent alcohol induced pancreatitis, pt was started on IV hydration, pain meds PRN, kept NPO. Addiction medicine was consulted.    # Acute on chronic Pancreatitis 2/2 chronic EtOH abuse  - c/w  IV hydration at 150cc/h  - pain medications PRN   - had been on clear liquid diet, advanced to soft yesterday but pain recurred--Return diet to full liquid but pt requesting soft even if it will cause abdominal pain  - Addiction medicine following for possible rehab placement  - Carafate 1g BID added for abd pain (possible element of erosive gastritis from chronic EtOH abuse)    # Constipation--resolved  - endorses having BM yesterday   - c/w Senna, Miralax, Lactulose   - Bentyl 10 mg TID     # Alcoholism / Etoh induced liver injury   - RUQ +ve for hepatic steatosis  - CIWA score 2 in AM  - ativan PRN   - catch team following for possible inpt rehab placement  - c/w folic acid and multivitamin  - monitor and replete electrolytes ,  - monitor LFTS  - avoid hepatotoxic medications     # h/o HTN  - c/w amlodipine     # Thrombocytopenia   - likely due to liver disease  - monitor platelets     # h/o b/l DVT   - Pt states that he had L calf DVT 5 years ago provoked by immobilization and had R leg DVT 3.5 yrs ago again provoked by immobilization. Of note, pt was discharged after 1st DVT on xarelto and never changed after recurrence. He endorses compliance.  - on Xarelto  - repeat LE Doppler -ve for any DVT--will consider dc'ing AC as pt high risk d/t thrombocytopenia 2/2 chronic liver dz  - pt has a high risk for AC ( heavy drinker)     MISC:  # DVT PPX: Xarelto  # GI PPX: Protonix  # Activity: as tolerated  # Diet--Full Liquid--AAT  # FULL CODE          PAST MEDICAL & SURGICAL HISTORY:  DVT (deep venous thrombosis)    HTN (hypertension)    EtOH dependence    H/O chronic pancreatitis

## 2021-04-20 NOTE — PROGRESS NOTE ADULT - SUBJECTIVE AND OBJECTIVE BOX
Patient is a 60y old  Male who presents with a chief complaint of abdominal pain, was diagnosed with acute recurrent alcohol induced pancreatitis, pt was started on IV hydration, pain meds PRN, kept NPO. Addiction medicine was consulted.  Pt clinically improved, constipation resolved, will advance diet as tolerated, pt feels better, still c/o pain ( below umbilicus mostly), looks comfortable at rest.       Vital Signs Last 24 Hrs  T(C): 36.9 (2021 14:32), Max: 36.9 (2021 14:32)  T(F): 98.4 (2021 14:32), Max: 98.4 (2021 14:32)  HR: 65 (2021 14:32) (65 - 85)  BP: 157/101 (2021 14:32) (145/92 - 177/101)  BP(mean): --  RR: 19 (2021 14:32) (18 - 19)  SpO2: --    PHYSICAL EXAM:  GENERAL: NAD, skinny   HEAD:  Atraumatic, Normocephalic  EYES: EOMI, PERRLA, conjunctiva and sclera clear  ENMT: No tonsillar erythema, exudates, or enlargement; Moist mucous membranes, Good dentition, No lesions  NECK: Supple, No JVD, Normal thyroid  NERVOUS SYSTEM:  Alert & Oriented X3, Good concentration; Motor Strength 5/5 B/L upper and lower extremities; DTRs 2+ intact and symmetric  CHEST/LUNG: Clear to percussion bilaterally; No rales, rhonchi, wheezing, or rubs  HEART: Regular rate and rhythm; No murmurs, rubs, or gallops  ABDOMEN: Soft, distended, diffuse tenderness noted on deep palpation, no rebound, no guarding, bowel sounds present  EXTREMITIES:  2+ Peripheral Pulses, No clubbing, cyanosis, or edema  LYMPH: No lymphadenopathy noted  SKIN: No rashes or lesions      LABS:                                      13.9   4.04  )-----------( 114      ( 2021 07:17 )             41.0   04-    138  |  101  |  5<L>  ----------------------------<  121<H>  3.9   |  26  |  0.8    Ca    9.0      2021 07:17  Phos  3.3     04-19  Mg     1.9     04-20    TPro  6.9  /  Alb  3.5  /  TBili  0.5  /  DBili  x   /  AST  45<H>  /  ALT  26  /  AlkPhos  109  04-20         PTT - ( 2021 18:02 )  PTT:33.4 sec  Urinalysis Basic - ( 2021 18:02 )    Color: Yellow / Appearance: Slightly Turbid / S.025 / pH: x  Gluc: x / Ketone: Small  / Bili: Small / Urobili: 3 mg/dL   Blood: x / Protein: 100 mg/dL / Nitrite: Negative   Leuk Esterase: Negative / RBC: 4 /HPF / WBC 2 /HPF   Sq Epi: x / Non Sq Epi: 0 /HPF / Bacteria: Negative    Culture - Urine (collected 2021 18:02)  Source: .Urine Clean Catch (Midstream)  Final Report (2021 01:26):    No growth    RADIOLOGY & ADDITIONAL tests:     < from: US Abdomen Limited (21 @ 00:16) >    IMPRESSION:    Hepatic steatosis.  Pancreas obscured by overlying bowel gas.  Otherwise unremarkable right upper quadrant ultrasound.    < end of copied text >  < from: CT Abdomen and Pelvis w/ IV Cont (21 @ 21:37) >  IMPRESSION:    No evidence for acute pulmonary embolus.    MEDICATIONS  (STANDING):  amLODIPine   Tablet 5 milliGRAM(s) Oral daily  chlorhexidine 4% Liquid 1 Application(s) Topical <User Schedule>  dicyclomine 10 milliGRAM(s) Oral three times a day before meals  folic acid 1 milliGRAM(s) Oral daily  lactated ringers. 1000 milliLiter(s) (150 mL/Hr) IV Continuous <Continuous>  lactulose Syrup 10 Gram(s) Oral two times a day  multivitamin 1 Tablet(s) Oral daily  pantoprazole    Tablet 40 milliGRAM(s) Oral before breakfast  polyethylene glycol 3350 17 Gram(s) Oral daily  rivaroxaban 20 milliGRAM(s) Oral with dinner  senna 2 Tablet(s) Oral at bedtime  sucralfate 1 Gram(s) Oral two times a day    MEDICATIONS  (PRN):  LORazepam   Injectable 2 milliGRAM(s) IV Push every 2 hours PRN CIWA score 8-15  LORazepam   Injectable 4 milliGRAM(s) IV Push every 1 hour PRN CIWA score greater than 15  morphine  - Injectable 2 milliGRAM(s) IV Push every 4 hours PRN Severe Pain (7 - 10)  oxycodone    5 mG/acetaminophen 325 mG 1 Tablet(s) Oral every 6 hours PRN Moderate Pain (4 - 6)

## 2021-04-20 NOTE — PROGRESS NOTE ADULT - ASSESSMENT
61 yo male with pmh of alcohol use disorder, HTN, DVT on Xarelto presents to the hospital with 2 day history of constant 10/10 non radiating anna-umbilical abdominal pain associated with nausea and nbnb vomiting. He reports he drinks about 5 pints of vodka / wine daily and his last drink was yesterday which resulted in vomiting. The patient notes that he has been drinking for many years, he  was diagnosed with acute recurrent alcohol induced pancreatitis, pt was started on IV hydration, pain meds PRN, kept NPO. Addiction medicine was consulted.    A/P     # Acute on chronic Etoh induced  pancreatitis  - c/w  IV hydration, decrease the rate of fluids to 100 ml/h   - pain medications PRN   -  advance slow as tolerated, start soft this afternoon    -  consulted regarding Etoh abstinence    # Constipation   - resolved   - on  Senna, Miralax, Lactulose   - Bentyl 10 mg TID     # Alcoholism / Etoh induced liver injury   - pt was consulted by abstinence   - [facility] protocol  - catch team follow up , pt might go to inpt Alcohol rehab   - c/w folic acid and start multivitamin  - monitor and replete electrolytes ,  - monitor LFTS  - RUQ US noted   - avoid hepatotoxic medications     # h/o HTN  - DASH diet   - on amlodipine     # Thrombocytopenia   - likely due to liver Dz  - monitor platelets ( resolving)     # h/o b/l DVT / PE   -  on Xarelto  -  LE Doppler is negative for DVT   - pt has a high risk for AC ( heavy drinker)     # DVT PPX: Xarelto  # GI PPX: PPI  # Activity: as tolerated    #Progress Note Handoff  Pending (specify):   advance as tolerated, c/w IV hydration ( decrease the rate of fluid to 100 ml/h ) , CATCH team follow up for dispo ( pt might be d/c to Alcohol rehab), anticipate discharge in 24 hours   Family discussion: I spoke with pt, he agreed with a plan of care   Disposition: Home_x __/SNF___/Other________/Unknown at this time________

## 2021-04-21 LAB
ALBUMIN SERPL ELPH-MCNC: 3.4 G/DL — LOW (ref 3.5–5.2)
ALP SERPL-CCNC: 97 U/L — SIGNIFICANT CHANGE UP (ref 30–115)
ALT FLD-CCNC: 26 U/L — SIGNIFICANT CHANGE UP (ref 0–41)
ANION GAP SERPL CALC-SCNC: 10 MMOL/L — SIGNIFICANT CHANGE UP (ref 7–14)
AST SERPL-CCNC: 41 U/L — SIGNIFICANT CHANGE UP (ref 0–41)
BILIRUB SERPL-MCNC: 0.4 MG/DL — SIGNIFICANT CHANGE UP (ref 0.2–1.2)
BUN SERPL-MCNC: 6 MG/DL — LOW (ref 10–20)
CALCIUM SERPL-MCNC: 9.1 MG/DL — SIGNIFICANT CHANGE UP (ref 8.5–10.1)
CHLORIDE SERPL-SCNC: 100 MMOL/L — SIGNIFICANT CHANGE UP (ref 98–110)
CO2 SERPL-SCNC: 25 MMOL/L — SIGNIFICANT CHANGE UP (ref 17–32)
CREAT SERPL-MCNC: 0.7 MG/DL — SIGNIFICANT CHANGE UP (ref 0.7–1.5)
GLUCOSE SERPL-MCNC: 91 MG/DL — SIGNIFICANT CHANGE UP (ref 70–99)
HCT VFR BLD CALC: 39.5 % — LOW (ref 42–52)
HGB BLD-MCNC: 13.5 G/DL — LOW (ref 14–18)
MAGNESIUM SERPL-MCNC: 1.6 MG/DL — LOW (ref 1.8–2.4)
MCHC RBC-ENTMCNC: 29.7 PG — SIGNIFICANT CHANGE UP (ref 27–31)
MCHC RBC-ENTMCNC: 34.2 G/DL — SIGNIFICANT CHANGE UP (ref 32–37)
MCV RBC AUTO: 86.8 FL — SIGNIFICANT CHANGE UP (ref 80–94)
NRBC # BLD: 0 /100 WBCS — SIGNIFICANT CHANGE UP (ref 0–0)
PLATELET # BLD AUTO: 114 K/UL — LOW (ref 130–400)
POTASSIUM SERPL-MCNC: 4.2 MMOL/L — SIGNIFICANT CHANGE UP (ref 3.5–5)
POTASSIUM SERPL-SCNC: 4.2 MMOL/L — SIGNIFICANT CHANGE UP (ref 3.5–5)
PROT SERPL-MCNC: 6.5 G/DL — SIGNIFICANT CHANGE UP (ref 6–8)
RBC # BLD: 4.55 M/UL — LOW (ref 4.7–6.1)
RBC # FLD: 12.8 % — SIGNIFICANT CHANGE UP (ref 11.5–14.5)
SODIUM SERPL-SCNC: 135 MMOL/L — SIGNIFICANT CHANGE UP (ref 135–146)
WBC # BLD: 4.02 K/UL — LOW (ref 4.8–10.8)
WBC # FLD AUTO: 4.02 K/UL — LOW (ref 4.8–10.8)

## 2021-04-21 PROCEDURE — 99233 SBSQ HOSP IP/OBS HIGH 50: CPT

## 2021-04-21 RX ORDER — OXYCODONE AND ACETAMINOPHEN 5; 325 MG/1; MG/1
1 TABLET ORAL EVERY 6 HOURS
Refills: 0 | Status: DISCONTINUED | OUTPATIENT
Start: 2021-04-21 | End: 2021-04-21

## 2021-04-21 RX ORDER — MAGNESIUM OXIDE 400 MG ORAL TABLET 241.3 MG
400 TABLET ORAL
Refills: 0 | Status: DISCONTINUED | OUTPATIENT
Start: 2021-04-21 | End: 2021-04-26

## 2021-04-21 RX ORDER — OXYCODONE HYDROCHLORIDE 5 MG/1
5 TABLET ORAL EVERY 6 HOURS
Refills: 0 | Status: DISCONTINUED | OUTPATIENT
Start: 2021-04-21 | End: 2021-04-26

## 2021-04-21 RX ORDER — MAGNESIUM SULFATE 500 MG/ML
2 VIAL (ML) INJECTION ONCE
Refills: 0 | Status: COMPLETED | OUTPATIENT
Start: 2021-04-21 | End: 2021-04-21

## 2021-04-21 RX ADMIN — LACTULOSE 10 GRAM(S): 10 SOLUTION ORAL at 05:13

## 2021-04-21 RX ADMIN — POLYETHYLENE GLYCOL 3350 17 GRAM(S): 17 POWDER, FOR SOLUTION ORAL at 11:36

## 2021-04-21 RX ADMIN — Medication 1 TABLET(S): at 11:36

## 2021-04-21 RX ADMIN — PANTOPRAZOLE SODIUM 40 MILLIGRAM(S): 20 TABLET, DELAYED RELEASE ORAL at 06:06

## 2021-04-21 RX ADMIN — OXYCODONE AND ACETAMINOPHEN 1 TABLET(S): 5; 325 TABLET ORAL at 12:08

## 2021-04-21 RX ADMIN — AMLODIPINE BESYLATE 5 MILLIGRAM(S): 2.5 TABLET ORAL at 05:13

## 2021-04-21 RX ADMIN — Medication 10 MILLIGRAM(S): at 11:36

## 2021-04-21 RX ADMIN — OXYCODONE HYDROCHLORIDE 5 MILLIGRAM(S): 5 TABLET ORAL at 17:56

## 2021-04-21 RX ADMIN — MORPHINE SULFATE 2 MILLIGRAM(S): 50 CAPSULE, EXTENDED RELEASE ORAL at 06:06

## 2021-04-21 RX ADMIN — MAGNESIUM OXIDE 400 MG ORAL TABLET 400 MILLIGRAM(S): 241.3 TABLET ORAL at 17:23

## 2021-04-21 RX ADMIN — OXYCODONE HYDROCHLORIDE 5 MILLIGRAM(S): 5 TABLET ORAL at 17:26

## 2021-04-21 RX ADMIN — MORPHINE SULFATE 2 MILLIGRAM(S): 50 CAPSULE, EXTENDED RELEASE ORAL at 00:50

## 2021-04-21 RX ADMIN — OXYCODONE AND ACETAMINOPHEN 1 TABLET(S): 5; 325 TABLET ORAL at 21:57

## 2021-04-21 RX ADMIN — Medication 1 MILLIGRAM(S): at 11:36

## 2021-04-21 RX ADMIN — Medication 25 GRAM(S): at 11:35

## 2021-04-21 RX ADMIN — MORPHINE SULFATE 2 MILLIGRAM(S): 50 CAPSULE, EXTENDED RELEASE ORAL at 01:40

## 2021-04-21 RX ADMIN — Medication 1 GRAM(S): at 05:13

## 2021-04-21 RX ADMIN — OXYCODONE AND ACETAMINOPHEN 1 TABLET(S): 5; 325 TABLET ORAL at 11:38

## 2021-04-21 RX ADMIN — RIVAROXABAN 20 MILLIGRAM(S): KIT at 17:24

## 2021-04-21 RX ADMIN — OXYCODONE AND ACETAMINOPHEN 1 TABLET(S): 5; 325 TABLET ORAL at 23:33

## 2021-04-21 RX ADMIN — CHLORHEXIDINE GLUCONATE 1 APPLICATION(S): 213 SOLUTION TOPICAL at 05:12

## 2021-04-21 RX ADMIN — Medication 10 MILLIGRAM(S): at 06:06

## 2021-04-21 RX ADMIN — MORPHINE SULFATE 2 MILLIGRAM(S): 50 CAPSULE, EXTENDED RELEASE ORAL at 05:49

## 2021-04-21 RX ADMIN — LACTULOSE 10 GRAM(S): 10 SOLUTION ORAL at 17:24

## 2021-04-21 NOTE — PROGRESS NOTE ADULT - SUBJECTIVE AND OBJECTIVE BOX
YESSICA BECKFORD 60y Male  MRN#: [id number]   Hospital Day: 4d    SUBJECTIVE  Patient is a 60y old Male who presents with a chief complaint of abdominal pain (20 Apr 2021 15:59)  Currently admitted to medicine with the primary diagnosis of Pancreatitis      INTERVAL HPI AND OVERNIGHT EVENTS:  Patient was examined and seen at bedside. This morning he is resting comfortably in bed.    REVIEW OF SYMPTOMS:  +abdominal pain. +anxiety about abstinence from etoh  denies any n/v/d/c, dysuria, cp, sob    OBJECTIVE  PAST MEDICAL & SURGICAL HISTORY  DVT (deep venous thrombosis)    HTN (hypertension)    EtOH dependence    H/O chronic pancreatitis      ALLERGIES:  No Known Allergies    MEDICATIONS:  STANDING MEDICATIONS  amLODIPine   Tablet 5 milliGRAM(s) Oral daily  chlorhexidine 4% Liquid 1 Application(s) Topical <User Schedule>  dicyclomine 10 milliGRAM(s) Oral three times a day before meals  folic acid 1 milliGRAM(s) Oral daily  lactulose Syrup 10 Gram(s) Oral two times a day  magnesium sulfate  IVPB 2 Gram(s) IV Intermittent once  multivitamin 1 Tablet(s) Oral daily  pantoprazole    Tablet 40 milliGRAM(s) Oral before breakfast  polyethylene glycol 3350 17 Gram(s) Oral daily  rivaroxaban 20 milliGRAM(s) Oral with dinner  senna 2 Tablet(s) Oral at bedtime  sucralfate 1 Gram(s) Oral two times a day    PRN MEDICATIONS  LORazepam   Injectable 2 milliGRAM(s) IV Push every 2 hours PRN  LORazepam   Injectable 4 milliGRAM(s) IV Push every 1 hour PRN  morphine  - Injectable 2 milliGRAM(s) IV Push every 4 hours PRN  oxycodone    5 mG/acetaminophen 325 mG 1 Tablet(s) Oral every 6 hours PRN      VITAL SIGNS: Last 24 Hours  T(C): 36.1 (21 Apr 2021 05:00), Max: 36.9 (20 Apr 2021 14:32)  T(F): 96.9 (21 Apr 2021 05:00), Max: 98.4 (20 Apr 2021 14:32)  HR: 64 (21 Apr 2021 05:00) (64 - 72)  BP: 152/83 (21 Apr 2021 05:00) (152/83 - 169/89)  BP(mean): --  RR: 18 (21 Apr 2021 05:00) (18 - 19)  SpO2: 97% (21 Apr 2021 05:00) (97% - 97%)    LABS:                        13.5   4.02  )-----------( 114      ( 21 Apr 2021 07:24 )             39.5     04-21    135  |  100  |  6<L>  ----------------------------<  91  4.2   |  25  |  0.7    Ca    9.1      21 Apr 2021 07:24  Phos  3.3     04-19  Mg     1.6     04-21    TPro  6.5  /  Alb  3.4<L>  /  TBili  0.4  /  DBili  x   /  AST  41  /  ALT  26  /  AlkPhos  97  04-21        RADIOLOGY:      PHYSICAL EXAM:  CONSTITUTIONAL: No acute distress, well-developed AAOx3  HEAD: Atraumatic, normocephalic  EYES: Sclera clear, no icterus, eomi  ENT: Supple  PULMONARY: Clear to auscultation bilaterally  CARDIOVASCULAR: Regular rate and rhythm;  GASTROINTESTINAL: Soft, diffusely tender, no rebound/guarding  MUSCULOSKELETAL: moves 4/4 extremities, no edema  NEUROLOGY: non-focal  SKIN: Intact    ASSESSMENT & PLAN  59 yo male with pmh of alcohol use disorder, HTN, DVT on Xarelto presents to the hospital with 2 day history of constant 10/10 non radiating anna-umbilical abdominal pain associated with nausea and nbnb vomiting. He reports he drinks about 5 pints of vodka / wine daily and his last drink was yesterday which resulted in vomiting. The patient notes that he has been drinking for many years, he  was diagnosed with acute recurrent alcohol induced pancreatitis, pt was started on IV hydration, pain meds PRN, kept NPO. Addiction medicine was consulted and CATCH team following for inpt etoh rehab placement    # Acute on chronic Pancreatitis 2/2 chronic EtOH abuse  - pain medications PRN   - Advance diet to low-fat but regular consistency  - Addiction medicine following for possible rehab placement  - c/w Carafate 1g BID (possible element of erosive gastritis from chronic EtOH abuse)  - DC ivf, encouraged po intake    # Constipation--resolved  - c/w Senna, Miralax, Lactulose   - Bentyl 10 mg TID     # Alcoholism / Etoh induced liver injury   - RUQ +ve for hepatic steatosis  - CIWA score 2 in AM  - ativan PRN   - catch team following for possible inpt rehab placement  - c/w folic acid and multivitamin  - monitor and replete electrolytes ,  - monitor LFTS  - avoid hepatotoxic medications     # h/o HTN  - c/w amlodipine     # Thrombocytopenia   - likely due to liver disease  - monitor platelets     # h/o b/l DVT   - Pt states that he had L calf DVT 5 years ago provoked by immobilization and had R leg DVT 3.5 yrs ago again provoked by immobilization and resulted in pulmonary embolism. Of note, pt was discharged after 1st DVT on xarelto and never changed after recurrence. He endorses compliance.  - c/w Xarelto  - repeat LE Doppler -ve for any DVT--will consider dc'ing AC as pt high risk d/t thrombocytopenia 2/2 chronic liver dz  - pt has a high risk for AC ( heavy drinker)     MISC:  # DVT PPX: Xarelto  # GI PPX: Protonix  # Activity: as tolerated  # Diet--Regular, low fat  # FULL CODE      PAST MEDICAL & SURGICAL HISTORY:  DVT (deep venous thrombosis)    HTN (hypertension)    EtOH dependence    H/O chronic pancreatitis     YESSICA BECKFORD 60y Male  MRN#: [id number]   Hospital Day: 4d    SUBJECTIVE  Patient is a 60y old Male who presents with a chief complaint of abdominal pain (20 Apr 2021 15:59)  Currently admitted to medicine with the primary diagnosis of Pancreatitis      INTERVAL HPI AND OVERNIGHT EVENTS:  Patient was examined and seen at bedside. This morning he is resting comfortably in bed.    REVIEW OF SYMPTOMS:  +abdominal pain. +anxiety about abstinence from etoh  denies any n/v/d/c, dysuria, cp, sob    OBJECTIVE  PAST MEDICAL & SURGICAL HISTORY  DVT (deep venous thrombosis)    HTN (hypertension)    EtOH dependence    H/O chronic pancreatitis      ALLERGIES:  No Known Allergies    MEDICATIONS:  STANDING MEDICATIONS  amLODIPine   Tablet 5 milliGRAM(s) Oral daily  chlorhexidine 4% Liquid 1 Application(s) Topical <User Schedule>  dicyclomine 10 milliGRAM(s) Oral three times a day before meals  folic acid 1 milliGRAM(s) Oral daily  lactulose Syrup 10 Gram(s) Oral two times a day  magnesium sulfate  IVPB 2 Gram(s) IV Intermittent once  multivitamin 1 Tablet(s) Oral daily  pantoprazole    Tablet 40 milliGRAM(s) Oral before breakfast  polyethylene glycol 3350 17 Gram(s) Oral daily  rivaroxaban 20 milliGRAM(s) Oral with dinner  senna 2 Tablet(s) Oral at bedtime  sucralfate 1 Gram(s) Oral two times a day    PRN MEDICATIONS  LORazepam   Injectable 2 milliGRAM(s) IV Push every 2 hours PRN  LORazepam   Injectable 4 milliGRAM(s) IV Push every 1 hour PRN  morphine  - Injectable 2 milliGRAM(s) IV Push every 4 hours PRN  oxycodone    5 mG/acetaminophen 325 mG 1 Tablet(s) Oral every 6 hours PRN      VITAL SIGNS: Last 24 Hours  T(C): 36.1 (21 Apr 2021 05:00), Max: 36.9 (20 Apr 2021 14:32)  T(F): 96.9 (21 Apr 2021 05:00), Max: 98.4 (20 Apr 2021 14:32)  HR: 64 (21 Apr 2021 05:00) (64 - 72)  BP: 152/83 (21 Apr 2021 05:00) (152/83 - 169/89)  BP(mean): --  RR: 18 (21 Apr 2021 05:00) (18 - 19)  SpO2: 97% (21 Apr 2021 05:00) (97% - 97%)    LABS:                        13.5   4.02  )-----------( 114      ( 21 Apr 2021 07:24 )             39.5     04-21    135  |  100  |  6<L>  ----------------------------<  91  4.2   |  25  |  0.7    Ca    9.1      21 Apr 2021 07:24  Phos  3.3     04-19  Mg     1.6     04-21    TPro  6.5  /  Alb  3.4<L>  /  TBili  0.4  /  DBili  x   /  AST  41  /  ALT  26  /  AlkPhos  97  04-21        RADIOLOGY:      PHYSICAL EXAM:  CONSTITUTIONAL: No acute distress, well-developed AAOx3  HEAD: Atraumatic, normocephalic  EYES: Sclera clear, no icterus, eomi  ENT: Supple  PULMONARY: Clear to auscultation bilaterally  CARDIOVASCULAR: Regular rate and rhythm;  GASTROINTESTINAL: Soft, diffusely tender, no rebound/guarding  MUSCULOSKELETAL: moves 4/4 extremities, no edema  NEUROLOGY: non-focal  SKIN: Intact    ASSESSMENT & PLAN  59 yo male with pmh of alcohol use disorder, HTN, DVT on Xarelto presents to the hospital with 2 day history of constant 10/10 non radiating anna-umbilical abdominal pain associated with nausea and nbnb vomiting. He reports he drinks about 5 pints of vodka / wine daily and his last drink was yesterday which resulted in vomiting. The patient notes that he has been drinking for many years, he  was diagnosed with acute recurrent alcohol induced pancreatitis, pt was started on IV hydration, pain meds PRN, kept NPO. Addiction medicine was consulted and CATCH team following for inpt etoh rehab placement    # Acute on chronic Pancreatitis 2/2 chronic EtOH abuse  - pain medications PRN--changed IV morphine to oxy 5 IR PRN severe (c/w percocet prn moderate)   - Advance diet to low-fat but regular consistency  - Addiction medicine following for possible rehab placement  - c/w Carafate 1g BID (possible element of erosive gastritis from chronic EtOH abuse)  - DC ivf, encouraged po intake    # Constipation--resolved  - c/w Senna, Miralax, Lactulose   - Bentyl 10 mg TID     # Alcoholism / Etoh induced liver injury   - RUQ +ve for hepatic steatosis  - CIWA score 2 in AM  - ativan PRN   - catch team following for possible inpt rehab placement  - c/w folic acid and multivitamin  - monitor and replete electrolytes ,  - monitor LFTS  - avoid hepatotoxic medications     # h/o HTN  - c/w amlodipine     # Thrombocytopenia   - likely due to liver disease  - monitor platelets     # h/o b/l DVT   - Pt states that he had L calf DVT 5 years ago provoked by immobilization and had R leg DVT 3.5 yrs ago again provoked by immobilization and resulted in pulmonary embolism. Of note, pt was discharged after 1st DVT on xarelto and never changed after recurrence. He endorses compliance.  - c/w Xarelto  - repeat LE Doppler -ve for any DVT--will consider dc'ing AC as pt high risk d/t thrombocytopenia 2/2 chronic liver dz  - pt has a high risk for AC ( heavy drinker)     MISC:  # DVT PPX: Xarelto  # GI PPX: Protonix  # Activity: as tolerated  # Diet--Regular, low fat  # FULL CODE      PAST MEDICAL & SURGICAL HISTORY:  DVT (deep venous thrombosis)    HTN (hypertension)    EtOH dependence    H/O chronic pancreatitis     YESSICA BECKFORD 60y Male  MRN#: [id number]   Hospital Day: 4d    SUBJECTIVE  Patient is a 60y old Male who presents with a chief complaint of abdominal pain (20 Apr 2021 15:59)  Currently admitted to medicine with the primary diagnosis of Pancreatitis      INTERVAL HPI AND OVERNIGHT EVENTS:  Patient was examined and seen at bedside. This morning he is resting comfortably in bed.    REVIEW OF SYMPTOMS:  +abdominal pain. +anxiety about abstinence from etoh  denies any n/v/d/c, dysuria, cp, sob    OBJECTIVE  PAST MEDICAL & SURGICAL HISTORY  DVT (deep venous thrombosis)    HTN (hypertension)    EtOH dependence    H/O chronic pancreatitis      ALLERGIES:  No Known Allergies    MEDICATIONS:  STANDING MEDICATIONS  amLODIPine   Tablet 5 milliGRAM(s) Oral daily  chlorhexidine 4% Liquid 1 Application(s) Topical <User Schedule>  dicyclomine 10 milliGRAM(s) Oral three times a day before meals  folic acid 1 milliGRAM(s) Oral daily  lactulose Syrup 10 Gram(s) Oral two times a day  magnesium sulfate  IVPB 2 Gram(s) IV Intermittent once  multivitamin 1 Tablet(s) Oral daily  pantoprazole    Tablet 40 milliGRAM(s) Oral before breakfast  polyethylene glycol 3350 17 Gram(s) Oral daily  rivaroxaban 20 milliGRAM(s) Oral with dinner  senna 2 Tablet(s) Oral at bedtime  sucralfate 1 Gram(s) Oral two times a day    PRN MEDICATIONS  LORazepam   Injectable 2 milliGRAM(s) IV Push every 2 hours PRN  LORazepam   Injectable 4 milliGRAM(s) IV Push every 1 hour PRN  morphine  - Injectable 2 milliGRAM(s) IV Push every 4 hours PRN  oxycodone    5 mG/acetaminophen 325 mG 1 Tablet(s) Oral every 6 hours PRN      VITAL SIGNS: Last 24 Hours  T(C): 36.1 (21 Apr 2021 05:00), Max: 36.9 (20 Apr 2021 14:32)  T(F): 96.9 (21 Apr 2021 05:00), Max: 98.4 (20 Apr 2021 14:32)  HR: 64 (21 Apr 2021 05:00) (64 - 72)  BP: 152/83 (21 Apr 2021 05:00) (152/83 - 169/89)  BP(mean): --  RR: 18 (21 Apr 2021 05:00) (18 - 19)  SpO2: 97% (21 Apr 2021 05:00) (97% - 97%)    LABS:                        13.5   4.02  )-----------( 114      ( 21 Apr 2021 07:24 )             39.5     04-21    135  |  100  |  6<L>  ----------------------------<  91  4.2   |  25  |  0.7    Ca    9.1      21 Apr 2021 07:24  Phos  3.3     04-19  Mg     1.6     04-21    TPro  6.5  /  Alb  3.4<L>  /  TBili  0.4  /  DBili  x   /  AST  41  /  ALT  26  /  AlkPhos  97  04-21        RADIOLOGY:      PHYSICAL EXAM:  CONSTITUTIONAL: No acute distress, well-developed AAOx3  HEAD: Atraumatic, normocephalic  EYES: Sclera clear, no icterus, eomi  ENT: Supple  PULMONARY: Clear to auscultation bilaterally  CARDIOVASCULAR: Regular rate and rhythm;  GASTROINTESTINAL: Soft, diffusely tender, no rebound/guarding  MUSCULOSKELETAL: moves 4/4 extremities, no edema  NEUROLOGY: non-focal  SKIN: Intact    ASSESSMENT & PLAN  61 yo male with pmh of alcohol use disorder, HTN, DVT on Xarelto presents to the hospital with 2 day history of constant 10/10 non radiating anna-umbilical abdominal pain associated with nausea and nbnb vomiting. He reports he drinks about 5 pints of vodka / wine daily and his last drink was yesterday which resulted in vomiting. The patient notes that he has been drinking for many years, he  was diagnosed with acute recurrent alcohol induced pancreatitis, pt was started on IV hydration, pain meds PRN, kept NPO. Addiction medicine was consulted and CATCH team following for inpt etoh rehab placement    # Acute on chronic Pancreatitis 2/2 chronic EtOH abuse  - pain medications PRN--changed IV morphine to oxy 5 IR PRN severe (c/w percocet prn moderate)   - Advance diet to low-fat but regular consistency  - Addiction medicine following for possible rehab placement  - c/w Carafate 1g BID (possible element of erosive gastritis from chronic EtOH abuse)  - DC ivf, encouraged po intake    # Constipation--resolved  - c/w Senna, Miralax, Lactulose   - Bentyl 10 mg TID     # Alcoholism / Etoh induced liver injury   - RUQ +ve for hepatic steatosis  - CIWA score 2 in AM  - ativan PRN   - catch team following for possible inpt rehab placement  - c/w folic acid and multivitamin  - monitor and replete electrolytes ,  - monitor LFTS  - avoid hepatotoxic medications     # h/o HTN  - c/w amlodipine     # Thrombocytopenia   - likely due to BMS due to alcohol abuse   - monitor platelets     # h/o b/l DVT   - Pt states that he had L calf DVT 5 years ago provoked by immobilization and had R leg DVT 3.5 yrs ago again provoked by immobilization and resulted in pulmonary embolism. Of note, pt was discharged after 1st DVT on xarelto and never changed after recurrence. He endorses compliance.  - c/w Xarelto  - repeat LE Doppler -ve for any DVT--will consider dc'ing AC as pt high risk d/t thrombocytopenia 2/2 chronic liver dz  - pt has a high risk for AC ( heavy drinker)     MISC:  # DVT PPX: Xarelto  # GI PPX: Protonix  # Activity: as tolerated  # Diet--Regular, low fat  # FULL CODE

## 2021-04-22 LAB — SARS-COV-2 RNA SPEC QL NAA+PROBE: SIGNIFICANT CHANGE UP

## 2021-04-22 PROCEDURE — 99232 SBSQ HOSP IP/OBS MODERATE 35: CPT

## 2021-04-22 RX ORDER — MULTIVIT-MIN/FERROUS GLUCONATE 9 MG/15 ML
1 LIQUID (ML) ORAL DAILY
Refills: 0 | Status: DISCONTINUED | OUTPATIENT
Start: 2021-04-22 | End: 2021-04-26

## 2021-04-22 RX ORDER — AMLODIPINE BESYLATE 2.5 MG/1
1 TABLET ORAL
Qty: 0 | Refills: 0 | DISCHARGE

## 2021-04-22 RX ORDER — MULTIVIT-MIN/FERROUS GLUCONATE 9 MG/15 ML
1 LIQUID (ML) ORAL
Qty: 0 | Refills: 0 | DISCHARGE
Start: 2021-04-22

## 2021-04-22 RX ORDER — ONDANSETRON 8 MG/1
4 TABLET, FILM COATED ORAL EVERY 8 HOURS
Refills: 0 | Status: DISCONTINUED | OUTPATIENT
Start: 2021-04-22 | End: 2021-04-26

## 2021-04-22 RX ADMIN — OXYCODONE HYDROCHLORIDE 5 MILLIGRAM(S): 5 TABLET ORAL at 08:50

## 2021-04-22 RX ADMIN — ONDANSETRON 4 MILLIGRAM(S): 8 TABLET, FILM COATED ORAL at 09:32

## 2021-04-22 RX ADMIN — Medication 1 TABLET(S): at 17:58

## 2021-04-22 RX ADMIN — Medication 1 MILLIGRAM(S): at 12:48

## 2021-04-22 RX ADMIN — MAGNESIUM OXIDE 400 MG ORAL TABLET 400 MILLIGRAM(S): 241.3 TABLET ORAL at 12:48

## 2021-04-22 RX ADMIN — MAGNESIUM OXIDE 400 MG ORAL TABLET 400 MILLIGRAM(S): 241.3 TABLET ORAL at 08:48

## 2021-04-22 RX ADMIN — OXYCODONE AND ACETAMINOPHEN 1 TABLET(S): 5; 325 TABLET ORAL at 12:50

## 2021-04-22 RX ADMIN — Medication 1 TABLET(S): at 12:49

## 2021-04-22 RX ADMIN — AMLODIPINE BESYLATE 5 MILLIGRAM(S): 2.5 TABLET ORAL at 05:46

## 2021-04-22 RX ADMIN — PANTOPRAZOLE SODIUM 40 MILLIGRAM(S): 20 TABLET, DELAYED RELEASE ORAL at 06:10

## 2021-04-22 RX ADMIN — OXYCODONE HYDROCHLORIDE 5 MILLIGRAM(S): 5 TABLET ORAL at 18:03

## 2021-04-22 RX ADMIN — RIVAROXABAN 20 MILLIGRAM(S): KIT at 17:58

## 2021-04-22 RX ADMIN — MAGNESIUM OXIDE 400 MG ORAL TABLET 400 MILLIGRAM(S): 241.3 TABLET ORAL at 17:58

## 2021-04-22 RX ADMIN — OXYCODONE HYDROCHLORIDE 5 MILLIGRAM(S): 5 TABLET ORAL at 09:20

## 2021-04-22 RX ADMIN — OXYCODONE AND ACETAMINOPHEN 1 TABLET(S): 5; 325 TABLET ORAL at 13:20

## 2021-04-22 NOTE — DISCHARGE NOTE PROVIDER - NSDCCPCAREPLAN_GEN_ALL_CORE_FT
PRINCIPAL DISCHARGE DIAGNOSIS  Diagnosis: Pancreatitis  Assessment and Plan of Treatment: Pancreatitis is a medical condition where the pancreas (an organ in the abdomen) becomes inflamed, thereby causing severe abdominal pain as well as vomiting. It is often caused by chronic alcohol use. It is recommended that you lower the amountof alchol you drink and eventually lower it to none, as this condition can recur if you continue to drink alcohol.

## 2021-04-22 NOTE — DISCHARGE NOTE PROVIDER - PROVIDER TOKENS
PROVIDER:[TOKEN:[[id number]],FOLLOWUP:[2 weeks],ESTABLISHEDPATIENT:[T]] PROVIDER:[TOKEN:[[id number]],FOLLOWUP:[2 weeks],ESTABLISHEDPATIENT:[T]],PROVIDER:[TOKEN:[[id number]],FOLLOWUP:[Routine]]

## 2021-04-22 NOTE — DISCHARGE NOTE PROVIDER - INSTRUCTIONS
Try and eat a low-fat diet until your abdominal pain subsides and refrain from alcohol use as it can cause your pancreatitis to come back Try and eat a low-fat diet until your abdominal pain subsides and refrain from alcohol use as it can cause your pancreatitis to come back.    If you feel constipated, you can take stool softeners (senna) and/or laxatives (miralax) as directed on the packaging. These can be purchased at your local pharmacy over the counter without a prescription

## 2021-04-22 NOTE — DISCHARGE NOTE PROVIDER - HOSPITAL COURSE
59 yo male with pmh of alcohol use disorder, HTN, DVT on Xarelto presents to the hospital with 2 day history of constant 10/10 non radiating anna-umbilical abdominal pain associated with nausea and nbnb vomiting. He reports he drinks about 5 pints of vodka / wine daily and his last drink was yesterday which resulted in vomiting. The patient notes that he has been drinking for many years. All other ros negative including chest pain, shortness of breath, constipation, diarrhea, dysuria       T(C): 35.8 , HR: 80 ,BP: 169/74 , RR: 18, SpO2: 99% on room air  Labs: transaminitis, lipase 82   RUQ U/S: hepatic steatosis; no cholelithiasis , CBD and intrahepatic biliary ducts unremarkable  CT Abdomen and Pelvis w/ IV Cont:  acute on chronic pancreatitis. No peripancreatic fluid collection. Main pancreatic duct measures 5-6 mm, nonspecific and without priors for comparison.      Pt was admitted to medicine for further mgmt and evaluation.  Pt placed on bowel rest and IVF and given pain control. Pt also given ativan PRN for etoh withdrawal but did not warrant formal taper protocol  Diet was advanced and sympomatically improved and tolerating PO diet.   CM/SW depts worked with pt for placement for inpatient rehab for chronic ETOH abuse, but was not accepted at facility and will be discharged home. 61 yo male with pmh of alcohol use disorder, HTN, DVT on Xarelto presents to the hospital with 2 day history of constant 10/10 non radiating anna-umbilical abdominal pain associated with nausea and nbnb vomiting. He reports he drinks about 5 pints of vodka / wine daily and his last drink was yesterday which resulted in vomiting. The patient notes that he has been drinking for many years. All other ros negative including chest pain, shortness of breath, constipation, diarrhea, dysuria       T(C): 35.8 , HR: 80 ,BP: 169/74 , RR: 18, SpO2: 99% on room air  Labs: transaminitis, lipase 82   RUQ U/S: hepatic steatosis; no cholelithiasis , CBD and intrahepatic biliary ducts unremarkable  CT Abdomen and Pelvis w/ IV Cont:  acute on chronic pancreatitis. No peripancreatic fluid collection. Main pancreatic duct measures 5-6 mm, nonspecific and without priors for comparison.      Pt was admitted to medicine for further mgmt and evaluation.  Pt placed on bowel rest and IVF and given pain control. Pt also given ativan PRN for etoh withdrawal but did not warrant formal taper protocol  Diet was advanced and sympomatically improved and tolerating PO diet.   CM/SW depts worked with pt for placement for inpatient rehab for chronic ETOH abuse, but was not accepted at facility and will be discharged home.    Attending addendum: Patient seen and examined. Patient is stable for discharge. Tolerated diet. Pain is chronic due to chronic pancreatitis. Needs outpatient pain management follow up. Med rec reviewed.

## 2021-04-22 NOTE — DIETITIAN INITIAL EVALUATION ADULT. - ENERGY INTAKE
Poor (<50%) Pt has been on/off liquid & PO diet. Consistently receiving solid food since yesterday but reports intake still limited d/t nausea & abd pain. Did not consume breakfast this morning d/t nausea but looking forward to lunch. Reports only able to tolerate 50% dinner last night but had emesis later in night. Diet ed completed at bedside. Pt agreeable to supplements. Recs d/w LIP (Dr. Hobson).

## 2021-04-22 NOTE — DISCHARGE NOTE PROVIDER - CARE PROVIDERS DIRECT ADDRESSES
,DirectAddress_Unknown ,DirectAddress_Unknown,[email or web address]

## 2021-04-22 NOTE — DIETITIAN INITIAL EVALUATION ADULT. - PHYSCIAL ASSESSMENT
dosing wt incorrect as per pt reports. notes UBW consistently 145#, slight wt loss noted (6.5#/ 4.4%) but unclear time frame. no EMR wt hx. continue to monitor trends   skin intact

## 2021-04-22 NOTE — DISCHARGE NOTE PROVIDER - NSDCMRMEDTOKEN_GEN_ALL_CORE_FT
amLODIPine 5 mg oral tablet: 1 tab(s) orally once a day  folic acid 1 mg oral tablet: 1 tab(s) orally once a day  Multiple Vitamins with Minerals oral tablet: 1 tab(s) orally once a day  Xarelto 20 mg oral tablet: 1 tab(s) orally once a day (in the evening)

## 2021-04-22 NOTE — DISCHARGE NOTE PROVIDER - CARE PROVIDER_API CALL
AZAEL PULIDO  [id number]  [address]  [place] [address]  Phone: [phone]  Fax: [phone]  Established Patient  Follow Up Time: 2 weeks   AZAEL PULIDO  [id number]  [address]  [place] [address]  Phone: ([phone]  Fax: ()-  Established Patient  Follow Up Time: 2 weeks    Ivan Klein)  Anesthesiology; Pain Medicine  [address] [place] [address]  Phone: [phone]  Fax: [phone]  Follow Up Time: Routine

## 2021-04-22 NOTE — DIETITIAN INITIAL EVALUATION ADULT. - FACTORS AFF FOOD INTAKE
c/o persistent abd discomfort, nausea, vomiting last night. denied reflux or trouble chewing/swallowing despite edentulous.

## 2021-04-22 NOTE — DIETITIAN INITIAL EVALUATION ADULT. - OTHER INFO
Pt p/w worsening abd pain w. n/v x2d PTA. Hospital course complicated by acute on chronic pancreatitis 2/2 chronic EtOH abuse. CATCH team following for possible inpt rehab placement. Alcohol induced liver injury. Thrombocytopenia likely d/t BMS d/t EtOH abuse. h/o b/l DVT; HTN.

## 2021-04-22 NOTE — DIETITIAN INITIAL EVALUATION ADULT. - PERSON TAUGHT/METHOD
RD provided detailed low fat, low Na diet ed to pt at bedside. Discussed diet overviews, foods recommended/not recommended, label reading & portion sizes. Addressed pt questions PRN. Provided informational packet to pt. Will readdress ed at f/u./verbal instruction/written material/patient instructed

## 2021-04-22 NOTE — DIETITIAN INITIAL EVALUATION ADULT. - OTHER CALCULATIONS
est kcal needs = 1715-1860kcal/day (MSJ x1.2-1.3); est protein needs = 63-75g/day (1.0-1.2 g/kg CBW); est fluid needs = 1mL/kcal or per LIP

## 2021-04-22 NOTE — PROGRESS NOTE ADULT - SUBJECTIVE AND OBJECTIVE BOX
YESSICA BECKFORD 60y Male  MRN#: [id number]   Hospital Day: 5d    SUBJECTIVE  Patient is a 60y old Male who presents with a chief complaint of abdominal pain (21 Apr 2021 11:32)  Currently admitted to medicine with the primary diagnosis of Pancreatitis      INTERVAL HPI AND OVERNIGHT EVENTS:  Patient was examined and seen at bedside. This morning he is resting comfortably in bed.    REVIEW OF SYMPTOMS:  CONSTITUTIONAL: No weakness, fevers or chills; No headaches  EYES: No visual changes, eye pain, or discharge  ENT: No vertigo; No ear pain or change in hearing; No sore throat or difficulty swallowing  NECK: No pain or stiffness  RESPIRATORY: No cough, wheezing, or hemoptysis; No shortness of breath  CARDIOVASCULAR: No chest pain or palpitations  GASTROINTESTINAL: No abdominal or epigastric pain; No nausea, vomiting, or hematemesis; No diarrhea or constipation; No melena or hematochezia  GENITOURINARY: No dysuria, frequency or hematuria  MUSCULOSKELETAL: No joint pain, no muscle pain, no weakness  NEUROLOGICAL: No numbness or weakness  SKIN: No itching or rashes    OBJECTIVE  PAST MEDICAL & SURGICAL HISTORY  DVT (deep venous thrombosis)    HTN (hypertension)    EtOH dependence    H/O chronic pancreatitis      ALLERGIES:  No Known Allergies    MEDICATIONS:  STANDING MEDICATIONS  amLODIPine   Tablet 5 milliGRAM(s) Oral daily  chlorhexidine 4% Liquid 1 Application(s) Topical <User Schedule>  folic acid 1 milliGRAM(s) Oral daily  lactulose Syrup 10 Gram(s) Oral two times a day  magnesium oxide 400 milliGRAM(s) Oral three times a day with meals  multivitamin 1 Tablet(s) Oral daily  pantoprazole    Tablet 40 milliGRAM(s) Oral before breakfast  polyethylene glycol 3350 17 Gram(s) Oral daily  rivaroxaban 20 milliGRAM(s) Oral with dinner  senna 2 Tablet(s) Oral at bedtime    PRN MEDICATIONS  LORazepam   Injectable 2 milliGRAM(s) IV Push every 2 hours PRN  LORazepam   Injectable 4 milliGRAM(s) IV Push every 1 hour PRN  ondansetron    Tablet 4 milliGRAM(s) Oral every 8 hours PRN  oxycodone    5 mG/acetaminophen 325 mG 1 Tablet(s) Oral every 6 hours PRN  oxyCODONE    IR 5 milliGRAM(s) Oral every 6 hours PRN      VITAL SIGNS: Last 24 Hours  T(C): 36.3 (22 Apr 2021 04:28), Max: 36.9 (21 Apr 2021 14:10)  T(F): 97.3 (22 Apr 2021 04:28), Max: 98.4 (21 Apr 2021 14:10)  HR: 54 (22 Apr 2021 04:28) (54 - 80)  BP: 161/92 (22 Apr 2021 04:28) (131/72 - 164/95)  BP(mean): --  RR: 18 (22 Apr 2021 04:28) (18 - 18)  SpO2: 98% (21 Apr 2021 22:54) (95% - 98%)    LABS:                        13.5   4.02  )-----------( 114      ( 21 Apr 2021 07:24 )             39.5     04-21    135  |  100  |  6<L>  ----------------------------<  91  4.2   |  25  |  0.7    Ca    9.1      21 Apr 2021 07:24  Mg     1.6     04-21    TPro  6.5  /  Alb  3.4<L>  /  TBili  0.4  /  DBili  x   /  AST  41  /  ALT  26  /  AlkPhos  97  04-21                  RADIOLOGY:      PHYSICAL EXAM:  CONSTITUTIONAL: No acute distress, well-developed AAOx3  HEAD: Atraumatic, normocephalic  EYES: Sclera clear, no icterus, eomi  ENT: Supple  PULMONARY: Clear to auscultation bilaterally  CARDIOVASCULAR: Regular rate and rhythm;  GASTROINTESTINAL: Soft, +epigastric tenderness, no rebound/guarding  MUSCULOSKELETAL: moves 4/4 extremities, no edema  NEUROLOGY: non-focal  SKIN: Intact    ASSESSMENT & PLAN  59 yo male with pmh of alcohol use disorder, HTN, DVT on Xarelto presents to the hospital with 2 day history of constant 10/10 non radiating anna-umbilical abdominal pain associated with nausea and nbnb vomiting. He reports he drinks about 5 pints of vodka / wine daily and his last drink was yesterday which resulted in vomiting. The patient notes that he has been drinking for many years, he  was diagnosed with acute recurrent alcohol induced pancreatitis, pt was started on IV hydration, pain meds PRN, kept NPO. Addiction medicine was consulted and CATCH team following for inpt etoh rehab placement    # Acute on chronic Pancreatitis 2/2 chronic EtOH abuse  - pain medications PRN--changed IV morphine to oxy 5 IR PRN severe (c/w percocet prn moderate)   - Advance diet to low-fat but regular consistency  - Addiction medicine following for possible rehab placement  - c/w Carafate 1g BID (possible element of erosive gastritis from chronic EtOH abuse)  - DC ivf, encouraged po intake    # Constipation--resolved  - c/w Senna, Miralax, Lactulose   - Bentyl 10 mg TID     # Alcoholism / Etoh induced liver injury   - RUQ +ve for hepatic steatosis  - CIWA score 2 in AM  - ativan PRN   - catch team following for possible inpt rehab placement  - c/w folic acid and multivitamin  - monitor and replete electrolytes ,  - monitor LFTS  - avoid hepatotoxic medications     # h/o HTN  - c/w amlodipine     # Thrombocytopenia   - likely due to BMS due to alcohol abuse   - monitor platelets     # h/o b/l DVT   - Pt states that he had L calf DVT 5 years ago provoked by immobilization and had R leg DVT 3.5 yrs ago again provoked by immobilization and resulted in pulmonary embolism. Of note, pt was discharged after 1st DVT on xarelto and never changed after recurrence. He endorses compliance.  - c/w Xarelto  - repeat LE Doppler -ve for any DVT--will consider dc'ing AC as pt high risk d/t thrombocytopenia 2/2 chronic liver dz  - pt has a high risk for AC ( heavy drinker)     MISC:  # DVT PPX: Xarelto  # GI PPX: Protonix  # Activity: as tolerated  # Diet--Regular, low fat  # FULL CODE      PAST MEDICAL & SURGICAL HISTORY:  DVT (deep venous thrombosis)    HTN (hypertension)    EtOH dependence    H/O chronic pancreatitis     YESSICA BECKFORD 60y Male  MRN#: [id number]   Hospital Day: 5d    SUBJECTIVE  Patient is a 60y old Male who presents with a chief complaint of abdominal pain (21 Apr 2021 11:32)  Currently admitted to medicine with the primary diagnosis of Pancreatitis      INTERVAL HPI AND OVERNIGHT EVENTS:  Patient was examined and seen at bedside. This morning he is resting c/o abdominal pain and nausea/vomiting o/n after eating dinner    REVIEW OF SYMPTOMS:  +N/V, +excessive stools but no diarrhea, +abdominal pain    OBJECTIVE  PAST MEDICAL & SURGICAL HISTORY  DVT (deep venous thrombosis)    HTN (hypertension)    EtOH dependence    H/O chronic pancreatitis      ALLERGIES:  No Known Allergies    MEDICATIONS:  STANDING MEDICATIONS  amLODIPine   Tablet 5 milliGRAM(s) Oral daily  chlorhexidine 4% Liquid 1 Application(s) Topical <User Schedule>  folic acid 1 milliGRAM(s) Oral daily  lactulose Syrup 10 Gram(s) Oral two times a day  magnesium oxide 400 milliGRAM(s) Oral three times a day with meals  multivitamin 1 Tablet(s) Oral daily  pantoprazole    Tablet 40 milliGRAM(s) Oral before breakfast  polyethylene glycol 3350 17 Gram(s) Oral daily  rivaroxaban 20 milliGRAM(s) Oral with dinner  senna 2 Tablet(s) Oral at bedtime    PRN MEDICATIONS  LORazepam   Injectable 2 milliGRAM(s) IV Push every 2 hours PRN  LORazepam   Injectable 4 milliGRAM(s) IV Push every 1 hour PRN  ondansetron    Tablet 4 milliGRAM(s) Oral every 8 hours PRN  oxycodone    5 mG/acetaminophen 325 mG 1 Tablet(s) Oral every 6 hours PRN  oxyCODONE    IR 5 milliGRAM(s) Oral every 6 hours PRN      VITAL SIGNS: Last 24 Hours  T(C): 36.3 (22 Apr 2021 04:28), Max: 36.9 (21 Apr 2021 14:10)  T(F): 97.3 (22 Apr 2021 04:28), Max: 98.4 (21 Apr 2021 14:10)  HR: 54 (22 Apr 2021 04:28) (54 - 80)  BP: 161/92 (22 Apr 2021 04:28) (131/72 - 164/95)  BP(mean): --  RR: 18 (22 Apr 2021 04:28) (18 - 18)  SpO2: 98% (21 Apr 2021 22:54) (95% - 98%)    LABS:                        13.5   4.02  )-----------( 114      ( 21 Apr 2021 07:24 )             39.5     04-21    135  |  100  |  6<L>  ----------------------------<  91  4.2   |  25  |  0.7    Ca    9.1      21 Apr 2021 07:24  Mg     1.6     04-21    TPro  6.5  /  Alb  3.4<L>  /  TBili  0.4  /  DBili  x   /  AST  41  /  ALT  26  /  AlkPhos  97  04-21                  RADIOLOGY:      PHYSICAL EXAM:  CONSTITUTIONAL: No acute distress, well-developed AAOx3  HEAD: Atraumatic, normocephalic  EYES: Sclera clear, no icterus, eomi  ENT: Supple  PULMONARY: Clear to auscultation bilaterally  CARDIOVASCULAR: Regular rate and rhythm;  GASTROINTESTINAL: Soft, +epigastric tenderness, no rebound/guarding  MUSCULOSKELETAL: moves 4/4 extremities, no edema  NEUROLOGY: non-focal  SKIN: Intact    ASSESSMENT & PLAN  59 yo male with pmh of alcohol use disorder, HTN, DVT on Xarelto presents to the hospital with 2 day history of constant 10/10 non radiating anna-umbilical abdominal pain associated with nausea and nbnb vomiting. He reports he drinks about 5 pints of vodka / wine daily and his last drink was yesterday which resulted in vomiting. The patient notes that he has been drinking for many years, he  was diagnosed with acute recurrent alcohol induced pancreatitis, pt was started on IV hydration, pain meds PRN, kept NPO. Addiction medicine was consulted and CATCH team following for inpt etoh rehab placement    # Acute on chronic Pancreatitis 2/2 chronic EtOH abuse  - pain medications PRN--changed IV morphine to oxy 5 IR PRN severe (c/w percocet prn moderate)   - Advance diet to low-fat but regular consistency  - Addiction medicine following for possible rehab placement  - c/w Carafate 1g BID (possible element of erosive gastritis from chronic EtOH abuse)  - Zofran PRN    # Constipation--resolved  - c/w Senna, Miralax, Lactulose   - Bentyl 10 mg TID     # Alcoholism / Etoh induced liver injury   - RUQ +ve for hepatic steatosis  - CIWA score 0 in AM  - ativan PRN--has not received in several days   - catch team following for possible inpt rehab placement  - c/w folic acid and multivitamin  - monitor and replete electrolytes ,  - monitor LFTS  - avoid hepatotoxic medications     # h/o HTN  - c/w amlodipine     # Thrombocytopenia   - likely due to BMS due to alcohol abuse   - monitor platelets     # h/o b/l DVT   - Pt states that he had L calf DVT 5 years ago provoked by immobilization and had R leg DVT 3.5 yrs ago again provoked by immobilization and resulted in pulmonary embolism. Of note, pt was discharged after 1st DVT on xarelto and never changed after recurrence. He endorses compliance.  - c/w Xarelto  - repeat LE Doppler -ve for any DVT  - pt has a high risk for AC ( heavy drinker) but benefits>risks in light of h/o multiple DVTs with resultant PE    MISC:  # DVT PPX: Xarelto  # GI PPX: Protonix  # Activity: as tolerated  # Diet--Regular, low fat  # FULL CODE      PAST MEDICAL & SURGICAL HISTORY:  DVT (deep venous thrombosis)    HTN (hypertension)    EtOH dependence    H/O chronic pancreatitis

## 2021-04-22 NOTE — DIETITIAN INITIAL EVALUATION ADULT. - CONTINUE CURRENT NUTRITION CARE PLAN
1. Add Ensure compact TID (chocolate only). 2. Low Fat, DASH/TLC diet w. emphasis on small/frequent meals for optimal tolerance. 3. Consider providing antiemetic prior to meals to optimize intake. 4. Adjust to multivitamin w. minerals & add thiamine. Continue folic acid. d/t EtOH abuse.

## 2021-04-22 NOTE — DISCHARGE NOTE PROVIDER - NSDCFUADDINST_GEN_ALL_CORE_FT
Make sure to follow up with your primary care doctor, if you do not have one, you can make an appointment at the [facility] Medicine clinic at the [facility] next to the [facility] Make sure to follow up with your primary care doctor, if you do not have one, you can make an appointment at the [facility] Medicine clinic at the [facility] next to the [place].    For your pain, you can follow up with the Pain specialist Dr. Klein

## 2021-04-22 NOTE — DISCHARGE NOTE PROVIDER - NSFOLLOWUPCLINICS_GEN_ALL_ED_FT
[facility] ([facility])  Rehabilitation  [facility] [address] [place] [address]  Phone: [phone]  Fax:   Follow Up Time: Routine    [facility] ([facility])  Rehabilitation  [address] [place] [id number]  Phone: [phone]  Fax:   Follow Up Time: Routine    [facility]  Medicine  [address], [place]   Phone: [phone]  Fax:   Follow Up Time: Routine

## 2021-04-23 LAB
ALBUMIN SERPL ELPH-MCNC: 3.7 G/DL — SIGNIFICANT CHANGE UP (ref 3.5–5.2)
ALP SERPL-CCNC: 101 U/L — SIGNIFICANT CHANGE UP (ref 30–115)
ALT FLD-CCNC: 32 U/L — SIGNIFICANT CHANGE UP (ref 0–41)
ANION GAP SERPL CALC-SCNC: 11 MMOL/L — SIGNIFICANT CHANGE UP (ref 7–14)
AST SERPL-CCNC: 44 U/L — HIGH (ref 0–41)
BILIRUB SERPL-MCNC: 0.3 MG/DL — SIGNIFICANT CHANGE UP (ref 0.2–1.2)
BUN SERPL-MCNC: 10 MG/DL — SIGNIFICANT CHANGE UP (ref 10–20)
CALCIUM SERPL-MCNC: 9.6 MG/DL — SIGNIFICANT CHANGE UP (ref 8.5–10.1)
CHLORIDE SERPL-SCNC: 100 MMOL/L — SIGNIFICANT CHANGE UP (ref 98–110)
CO2 SERPL-SCNC: 26 MMOL/L — SIGNIFICANT CHANGE UP (ref 17–32)
CREAT SERPL-MCNC: 0.9 MG/DL — SIGNIFICANT CHANGE UP (ref 0.7–1.5)
GLUCOSE SERPL-MCNC: 98 MG/DL — SIGNIFICANT CHANGE UP (ref 70–99)
HCT VFR BLD CALC: 40.1 % — LOW (ref 42–52)
HGB BLD-MCNC: 13.4 G/DL — LOW (ref 14–18)
MAGNESIUM SERPL-MCNC: 2 MG/DL — SIGNIFICANT CHANGE UP (ref 1.8–2.4)
MCHC RBC-ENTMCNC: 29.4 PG — SIGNIFICANT CHANGE UP (ref 27–31)
MCHC RBC-ENTMCNC: 33.4 G/DL — SIGNIFICANT CHANGE UP (ref 32–37)
MCV RBC AUTO: 87.9 FL — SIGNIFICANT CHANGE UP (ref 80–94)
NRBC # BLD: 0 /100 WBCS — SIGNIFICANT CHANGE UP (ref 0–0)
PLATELET # BLD AUTO: 159 K/UL — SIGNIFICANT CHANGE UP (ref 130–400)
POTASSIUM SERPL-MCNC: 4.5 MMOL/L — SIGNIFICANT CHANGE UP (ref 3.5–5)
POTASSIUM SERPL-SCNC: 4.5 MMOL/L — SIGNIFICANT CHANGE UP (ref 3.5–5)
PROT SERPL-MCNC: 6.7 G/DL — SIGNIFICANT CHANGE UP (ref 6–8)
RBC # BLD: 4.56 M/UL — LOW (ref 4.7–6.1)
RBC # FLD: 12.6 % — SIGNIFICANT CHANGE UP (ref 11.5–14.5)
SODIUM SERPL-SCNC: 137 MMOL/L — SIGNIFICANT CHANGE UP (ref 135–146)
WBC # BLD: 4.78 K/UL — LOW (ref 4.8–10.8)
WBC # FLD AUTO: 4.78 K/UL — LOW (ref 4.8–10.8)

## 2021-04-23 PROCEDURE — 99231 SBSQ HOSP IP/OBS SF/LOW 25: CPT

## 2021-04-23 RX ADMIN — LACTULOSE 10 GRAM(S): 10 SOLUTION ORAL at 17:25

## 2021-04-23 RX ADMIN — MAGNESIUM OXIDE 400 MG ORAL TABLET 400 MILLIGRAM(S): 241.3 TABLET ORAL at 13:12

## 2021-04-23 RX ADMIN — OXYCODONE AND ACETAMINOPHEN 1 TABLET(S): 5; 325 TABLET ORAL at 20:50

## 2021-04-23 RX ADMIN — POLYETHYLENE GLYCOL 3350 17 GRAM(S): 17 POWDER, FOR SOLUTION ORAL at 13:13

## 2021-04-23 RX ADMIN — RIVAROXABAN 20 MILLIGRAM(S): KIT at 17:47

## 2021-04-23 RX ADMIN — MAGNESIUM OXIDE 400 MG ORAL TABLET 400 MILLIGRAM(S): 241.3 TABLET ORAL at 17:57

## 2021-04-23 RX ADMIN — Medication 1 TABLET(S): at 13:12

## 2021-04-23 RX ADMIN — AMLODIPINE BESYLATE 5 MILLIGRAM(S): 2.5 TABLET ORAL at 05:36

## 2021-04-23 RX ADMIN — MAGNESIUM OXIDE 400 MG ORAL TABLET 400 MILLIGRAM(S): 241.3 TABLET ORAL at 17:25

## 2021-04-23 RX ADMIN — OXYCODONE HYDROCHLORIDE 5 MILLIGRAM(S): 5 TABLET ORAL at 13:16

## 2021-04-23 RX ADMIN — LACTULOSE 10 GRAM(S): 10 SOLUTION ORAL at 05:36

## 2021-04-23 RX ADMIN — OXYCODONE HYDROCHLORIDE 5 MILLIGRAM(S): 5 TABLET ORAL at 05:47

## 2021-04-23 RX ADMIN — Medication 1 MILLIGRAM(S): at 13:13

## 2021-04-23 RX ADMIN — OXYCODONE AND ACETAMINOPHEN 1 TABLET(S): 5; 325 TABLET ORAL at 09:37

## 2021-04-23 RX ADMIN — PANTOPRAZOLE SODIUM 40 MILLIGRAM(S): 20 TABLET, DELAYED RELEASE ORAL at 05:36

## 2021-04-23 NOTE — DISCHARGE NOTE NURSING/CASE MANAGEMENT/SOCIAL WORK - PATIENT PORTAL LINK FT
You can access the FollowMyHealth Patient Portal offered by [facility] by registering at the following website: [email or web address]. By joining [employer]’s FollowMyHealth portal, you will also be able to view your health information using other applications (apps) compatible with our system.

## 2021-04-23 NOTE — PROGRESS NOTE ADULT - SUBJECTIVE AND OBJECTIVE BOX
YESSICA BECKFORD 60y Male  MRN#: [id number]   Hospital Day: 6d    SUBJECTIVE  Patient is a 60y old Male who presents with a chief complaint of abdominal pain (22 Apr 2021 14:23)  Currently admitted to medicine with the primary diagnosis of Pancreatitis      INTERVAL HPI AND OVERNIGHT EVENTS:  Patient was examined and seen at bedside. This morning he is resting comfortably in bed.    REVIEW OF SYMPTOMS:  Denies any vomiting, anorexia, nausea, diarrhea constipation, cp, sob    OBJECTIVE  PAST MEDICAL & SURGICAL HISTORY  DVT (deep venous thrombosis)    HTN (hypertension)    EtOH dependence    H/O chronic pancreatitis      ALLERGIES:  No Known Allergies    MEDICATIONS:  STANDING MEDICATIONS  amLODIPine   Tablet 5 milliGRAM(s) Oral daily  chlorhexidine 4% Liquid 1 Application(s) Topical <User Schedule>  folic acid 1 milliGRAM(s) Oral daily  lactulose Syrup 10 Gram(s) Oral two times a day  magnesium oxide 400 milliGRAM(s) Oral three times a day with meals  multivitamin/minerals 1 Tablet(s) Oral daily  pantoprazole    Tablet 40 milliGRAM(s) Oral before breakfast  polyethylene glycol 3350 17 Gram(s) Oral daily  rivaroxaban 20 milliGRAM(s) Oral with dinner  senna 2 Tablet(s) Oral at bedtime    PRN MEDICATIONS  LORazepam   Injectable 2 milliGRAM(s) IV Push every 2 hours PRN  LORazepam   Injectable 4 milliGRAM(s) IV Push every 1 hour PRN  ondansetron    Tablet 4 milliGRAM(s) Oral every 8 hours PRN  oxycodone    5 mG/acetaminophen 325 mG 1 Tablet(s) Oral every 6 hours PRN  oxyCODONE    IR 5 milliGRAM(s) Oral every 6 hours PRN      VITAL SIGNS: Last 24 Hours  T(C): 36.2 (23 Apr 2021 04:47), Max: 36.5 (22 Apr 2021 13:55)  T(F): 97.1 (23 Apr 2021 04:47), Max: 97.7 (22 Apr 2021 13:55)  HR: 67 (23 Apr 2021 04:47) (59 - 67)  BP: 131/84 (23 Apr 2021 04:47) (131/84 - 144/90)  BP(mean): --  RR: 18 (23 Apr 2021 04:47) (18 - 18)  SpO2: 98% (22 Apr 2021 13:55) (98% - 98%)    LABS:                        13.4   4.78  )-----------( 159      ( 23 Apr 2021 05:43 )             40.1     04-23    137  |  100  |  10  ----------------------------<  98  4.5   |  26  |  0.9    Ca    9.6      23 Apr 2021 05:43  Mg     2.0     04-23    TPro  6.7  /  Alb  3.7  /  TBili  0.3  /  DBili  x   /  AST  44<H>  /  ALT  32  /  AlkPhos  101  04-23        RADIOLOGY:      PHYSICAL EXAM:  CONSTITUTIONAL: No acute distress, well-developed AAOx3  HEAD: Atraumatic, normocephalic  EYES: Sclera clear, no icterus, eomi  ENT: Supple  PULMONARY: Clear to auscultation bilaterally  CARDIOVASCULAR: Regular rate and rhythm;  GASTROINTESTINAL: Soft, +epigastric tenderness, no rebound/guarding  MUSCULOSKELETAL: moves 4/4 extremities, no edema  NEUROLOGY: non-focal  SKIN: Intact    ASSESSMENT & PLAN  61 yo male with pmh of alcohol use disorder, HTN, DVT on Xarelto presents to the hospital with 2 day history of constant 10/10 non radiating anna-umbilical abdominal pain associated with nausea and nbnb vomiting. He reports he drinks about 5 pints of vodka / wine daily and his last drink was yesterday which resulted in vomiting. The patient notes that he has been drinking for many years, he  was diagnosed with acute recurrent alcohol induced pancreatitis, pt was started on IV hydration, pain meds PRN, kept NPO. Addiction medicine was consulted and CATCH team following for inpt etoh rehab placement    # Acute on chronic Pancreatitis 2/2 chronic EtOH abuse  - pain medications PRN--changed IV morphine to oxy 5 IR PRN severe (c/w percocet prn moderate)   - Advance diet to low-fat but regular consistency  - Addiction medicine following for possible rehab placement  - c/w Carafate 1g BID (possible element of erosive gastritis from chronic EtOH abuse)  - Zofran PRN    # Constipation--resolved  - c/w Senna, Miralax, Lactulose   - Bentyl 10 mg TID     # Alcoholism / Etoh induced liver injury   - RUQ +ve for hepatic steatosis  - CIWA score 0 in AM  - ativan PRN--has not received in several days   - catch team following for possible inpt rehab placement  - c/w folic acid and multivitamin  - monitor and replete electrolytes ,  - monitor LFTS  - avoid hepatotoxic medications     # h/o HTN  - c/w amlodipine     # Thrombocytopenia   - likely due to BMS due to alcohol abuse   - monitor platelets     # h/o b/l DVT   - Pt states that he had L calf DVT 5 years ago provoked by immobilization and had R leg DVT 3.5 yrs ago again provoked by immobilization and resulted in pulmonary embolism. Of note, pt was discharged after 1st DVT on xarelto and never changed after recurrence. He endorses compliance.  - c/w Xarelto  - repeat LE Doppler -ve for any DVT  - pt has a high risk for AC ( heavy drinker) but benefits>risks in light of h/o multiple DVTs with resultant PE    MISC:  # DVT PPX: Xarelto  # GI PPX: Protonix  # Activity: as tolerated  # Diet--Regular, low fat  # FULL CODE  # Dispo: Appealing discharge, states he thinks he'll feel okay for discharge tomorrow. Not eligible for acute inpt etoh rehab d/t eliquis      PAST MEDICAL & SURGICAL HISTORY:  DVT (deep venous thrombosis)    HTN (hypertension)    EtOH dependence    H/O chronic pancreatitis

## 2021-04-24 PROCEDURE — 99231 SBSQ HOSP IP/OBS SF/LOW 25: CPT

## 2021-04-24 RX ADMIN — OXYCODONE HYDROCHLORIDE 5 MILLIGRAM(S): 5 TABLET ORAL at 07:04

## 2021-04-24 RX ADMIN — OXYCODONE AND ACETAMINOPHEN 1 TABLET(S): 5; 325 TABLET ORAL at 15:02

## 2021-04-24 RX ADMIN — RIVAROXABAN 20 MILLIGRAM(S): KIT at 17:16

## 2021-04-24 RX ADMIN — Medication 1 TABLET(S): at 15:00

## 2021-04-24 RX ADMIN — MAGNESIUM OXIDE 400 MG ORAL TABLET 400 MILLIGRAM(S): 241.3 TABLET ORAL at 17:16

## 2021-04-24 RX ADMIN — Medication 1 MILLIGRAM(S): at 14:59

## 2021-04-24 RX ADMIN — AMLODIPINE BESYLATE 5 MILLIGRAM(S): 2.5 TABLET ORAL at 07:04

## 2021-04-24 RX ADMIN — MAGNESIUM OXIDE 400 MG ORAL TABLET 400 MILLIGRAM(S): 241.3 TABLET ORAL at 15:00

## 2021-04-24 NOTE — PROGRESS NOTE ADULT - SUBJECTIVE AND OBJECTIVE BOX
YESSICA BECKFORD 60y Male  MRN#: [id number]   Hospital Day: 7d    SUBJECTIVE  Patient is a 60y old Male who presents with a chief complaint of abdominal pain (23 Apr 2021 10:28)  Currently admitted to medicine with the primary diagnosis of Pancreatitis      INTERVAL HPI AND OVERNIGHT EVENTS:  Patient was examined and seen at bedside. This morning he is resting comfortably in bed.  yesterday pt informed he lost his discharge appeal and that he needed to leave hospital by 12pm today. Pt states he will place second appeal this morning in order to stay in hospital until monday where he will go to inpt rehab     REVIEW OF SYMPTOMS:  Denies any n/v/d/c, +abdominal pain but improving throughout course of admission    OBJECTIVE  PAST MEDICAL & SURGICAL HISTORY  DVT (deep venous thrombosis)    HTN (hypertension)    EtOH dependence    H/O chronic pancreatitis      ALLERGIES:  No Known Allergies    MEDICATIONS:  STANDING MEDICATIONS  amLODIPine   Tablet 5 milliGRAM(s) Oral daily  chlorhexidine 4% Liquid 1 Application(s) Topical <User Schedule>  folic acid 1 milliGRAM(s) Oral daily  lactulose Syrup 10 Gram(s) Oral two times a day  magnesium oxide 400 milliGRAM(s) Oral three times a day with meals  multivitamin/minerals 1 Tablet(s) Oral daily  pantoprazole    Tablet 40 milliGRAM(s) Oral before breakfast  polyethylene glycol 3350 17 Gram(s) Oral daily  rivaroxaban 20 milliGRAM(s) Oral with dinner  senna 2 Tablet(s) Oral at bedtime    PRN MEDICATIONS  LORazepam   Injectable 2 milliGRAM(s) IV Push every 2 hours PRN  LORazepam   Injectable 4 milliGRAM(s) IV Push every 1 hour PRN  ondansetron    Tablet 4 milliGRAM(s) Oral every 8 hours PRN  oxycodone    5 mG/acetaminophen 325 mG 1 Tablet(s) Oral every 6 hours PRN  oxyCODONE    IR 5 milliGRAM(s) Oral every 6 hours PRN      VITAL SIGNS: Last 24 Hours  T(C): 36.4 (24 Apr 2021 05:05), Max: 36.4 (23 Apr 2021 12:23)  T(F): 97.5 (24 Apr 2021 05:05), Max: 97.6 (23 Apr 2021 12:23)  HR: 70 (24 Apr 2021 05:05) (60 - 70)  BP: 133/86 (24 Apr 2021 05:05) (133/81 - 159/95)  BP(mean): --  RR: 18 (24 Apr 2021 05:05) (18 - 19)  SpO2: --    LABS:                        13.4   4.78  )-----------( 159      ( 23 Apr 2021 05:43 )             40.1     04-23    137  |  100  |  10  ----------------------------<  98  4.5   |  26  |  0.9    Ca    9.6      23 Apr 2021 05:43  Mg     2.0     04-23    TPro  6.7  /  Alb  3.7  /  TBili  0.3  /  DBili  x   /  AST  44<H>  /  ALT  32  /  AlkPhos  101  04-23                  RADIOLOGY:      PHYSICAL EXAM:  CONSTITUTIONAL: No acute distress, well-developed AAOx3  HEAD: Atraumatic, normocephalic  EYES: Sclera clear, no icterus, eomi  ENT: Supple  PULMONARY: Clear to auscultation bilaterally  CARDIOVASCULAR: Regular rate and rhythm;  GASTROINTESTINAL: Soft, +epigastric tenderness, no rebound/guarding  MUSCULOSKELETAL: moves 4/4 extremities, no edema  NEUROLOGY: non-focal  SKIN: Intact    ASSESSMENT & PLAN  59 yo male with pmh of alcohol use disorder, HTN, DVT on Xarelto presents to the hospital with 2 day history of constant 10/10 non radiating anna-umbilical abdominal pain associated with nausea and nbnb vomiting. He reports he drinks about 5 pints of vodka / wine daily and his last drink was yesterday which resulted in vomiting. The patient notes that he has been drinking for many years, he  was diagnosed with acute recurrent alcohol induced pancreatitis, pt was started on IV hydration, pain meds PRN, kept NPO. Addiction medicine was consulted and CATCH team following for inpt etoh rehab placement    # Acute on chronic Pancreatitis 2/2 chronic EtOH abuse  - pain medications PRN--changed IV morphine to oxy 5 IR PRN severe (c/w percocet prn moderate)   - Advance diet to low-fat but regular consistency  - Addiction medicine following for possible rehab placement  - c/w Carafate 1g BID (possible element of erosive gastritis from chronic EtOH abuse)  - Zofran PRN    # Constipation--resolved  - c/w Senna, Miralax, Lactulose   - Bentyl 10 mg TID     # Alcoholism / Etoh induced liver injury   - RUQ +ve for hepatic steatosis  - CIWA score 0 in AM  - ativan PRN--has not received in several days   - catch team following for possible inpt rehab placement  - c/w folic acid and multivitamin  - monitor and replete electrolytes ,  - monitor LFTS  - avoid hepatotoxic medications     # h/o HTN  - c/w amlodipine     # Thrombocytopenia   - likely due to BMS due to alcohol abuse   - monitor platelets     # h/o b/l DVT   - Pt states that he had L calf DVT 5 years ago provoked by immobilization and had R leg DVT 3.5 yrs ago again provoked by immobilization and resulted in pulmonary embolism. Of note, pt was discharged after 1st DVT on xarelto and never changed after recurrence. He endorses compliance.  - c/w Xarelto  - repeat LE Doppler -ve for any DVT  - pt has a high risk for AC ( heavy drinker) but benefits>risks in light of h/o multiple DVTs with resultant PE    MISC:  # DVT PPX: Xarelto  # GI PPX: Protonix  # Activity: as tolerated  # Diet--Regular, low fat  # FULL CODE  # Dispo: Lost appeal, however states he was accepted to inpt rehab but they do not admit over the weekend. Pt states he's placing a second appeal today and states he wants to leave monday.      PAST MEDICAL & SURGICAL HISTORY:  DVT (deep venous thrombosis)    HTN (hypertension)    EtOH dependence    H/O chronic pancreatitis

## 2021-04-24 NOTE — PROGRESS NOTE ADULT - ATTENDING COMMENTS
Patient seen today. No pain, tolerating PO intake. No signs of alcohol withdrawal.   Lost the appeal yesterday, patient wants to request another appeal. CM notified.   Patient wants to go to drug rehab on Monday therefore he's requesting to stay in the hospital until then.     Agree with resident's note above.   Exceptions:  #Magnesium deficiency - c/w mag ox TID   #Suspected folate and thiamine deficiencies - c/w supplementation     #Progress Note Handoff  Pending (specify):  dc appeal decision, lost first appeal, now wants to file another appeal   Family discussion: d/w pt plan as above   Disposition: Home
Patient seen today. No pain, tolerating PO intake. No signs of alcohol withdrawal.   Discharge appeal decision pending.     Agree with resident's note above.   Exceptions:  #Magnesium deficiency - c/w mag ox TID   #Suspected folate and thiamine deficiencies - c/w supplementation     #Progress Note Handoff  Pending (specify):  dc appeal decision   Family discussion: d/w pt plan as above   Disposition: Home
Patient seen and examined. Patient continues to have pain, tolerating PO intake. No signs of alcohol withdrawal. Dc IVF. PO pain meds for pain. Will anticipate for dc in AM.   Agree with resident's note above.   Exceptions:  #Magnesium deficiency - start mag ox TID   #Suspected folate and thiamine deficiencies - c/w supplementation     #Progress Note Handoff  Pending (specify):  anticipated for dc in AM, pain control, tolerate diet   Family discussion: d/w pt plan as above   Disposition: Home vs alcohol rehab

## 2021-04-25 PROCEDURE — 99231 SBSQ HOSP IP/OBS SF/LOW 25: CPT

## 2021-04-25 RX ADMIN — Medication 1 MILLIGRAM(S): at 12:30

## 2021-04-25 RX ADMIN — OXYCODONE HYDROCHLORIDE 5 MILLIGRAM(S): 5 TABLET ORAL at 14:59

## 2021-04-25 RX ADMIN — OXYCODONE HYDROCHLORIDE 5 MILLIGRAM(S): 5 TABLET ORAL at 21:55

## 2021-04-25 RX ADMIN — PANTOPRAZOLE SODIUM 40 MILLIGRAM(S): 20 TABLET, DELAYED RELEASE ORAL at 06:46

## 2021-04-25 RX ADMIN — AMLODIPINE BESYLATE 5 MILLIGRAM(S): 2.5 TABLET ORAL at 06:46

## 2021-04-25 RX ADMIN — MAGNESIUM OXIDE 400 MG ORAL TABLET 400 MILLIGRAM(S): 241.3 TABLET ORAL at 17:37

## 2021-04-25 RX ADMIN — LACTULOSE 10 GRAM(S): 10 SOLUTION ORAL at 06:46

## 2021-04-25 RX ADMIN — Medication 1 TABLET(S): at 12:30

## 2021-04-25 RX ADMIN — MAGNESIUM OXIDE 400 MG ORAL TABLET 400 MILLIGRAM(S): 241.3 TABLET ORAL at 12:30

## 2021-04-25 RX ADMIN — RIVAROXABAN 20 MILLIGRAM(S): KIT at 17:37

## 2021-04-25 RX ADMIN — MAGNESIUM OXIDE 400 MG ORAL TABLET 400 MILLIGRAM(S): 241.3 TABLET ORAL at 07:56

## 2021-04-25 RX ADMIN — OXYCODONE HYDROCHLORIDE 5 MILLIGRAM(S): 5 TABLET ORAL at 08:42

## 2021-04-25 NOTE — PROGRESS NOTE ADULT - ASSESSMENT
59 yo male with PMH of alcohol use disorder, HTN, DVT on Xarelto presents to the hospital with acute on chronic alcoholic pancreatitis.       # Acute on chronic Pancreatitis 2/2 EtOH abuse  - tolerating diet, pain control     # Alcohol dependence   # Suspected folate deficiency   # Suspected thiamine deficiency   # Magnesium deficiency   - c/w supplementation   - [facility] protocol     # h/o HTN  - c/w amlodipine     # Thrombocytopenia   - likely due to BMS due to alcohol abuse   - resolved     # h/o b/l DVT   - c/w Xarelto       #Progress Note Handoff  Pending (specify):  dc appeal 2nd level   Family discussion: d/w pt plan as above   Disposition: Home vs inpatient drug rehab

## 2021-04-25 NOTE — PROGRESS NOTE ADULT - SUBJECTIVE AND OBJECTIVE BOX
No events overnight.     VITAL SIGNS (Last 24 hrs):  T(C): 36.6 (04-25-21 @ 13:41), Max: 36.7 (04-25-21 @ 05:40)  HR: 70 (04-25-21 @ 13:41) (65 - 71)  BP: 144/66 (04-25-21 @ 13:41) (128/83 - 144/66)  RR: 18 (04-25-21 @ 13:41) (18 - 18)         PHYSICAL EXAM:  GENERAL: NAD  HEAD:  Atraumatic, Normocephalic  EYES: EOMI, PERRLA, conjunctiva and sclera clear  NECK: Supple, No JVD  CHEST/LUNG: Clear to auscultation bilaterally; No wheeze  HEART: Regular rate and rhythm; No murmurs, rubs, or gallops  ABDOMEN: Soft, Nontender, Nondistended; Bowel sounds present  EXTREMITIES:  2+ Peripheral Pulses, No clubbing, cyanosis, or edema  PSYCH: AAOx3  NEUROLOGY: non-focal  SKIN: No rashes or lesions         CBC Full  -  ( 23 Apr 2021 05:43 )  WBC Count : 4.78 K/uL  Hemoglobin : 13.4 g/dL  Hematocrit : 40.1 %  Platelet Count - Automated : 159 K/uL  Mean Cell Volume : 87.9 fL  Mean Cell Hemoglobin : 29.4 pg  Mean Cell Hemoglobin Concentration : 33.4 g/dL  Auto Neutrophil # : x  Auto Lymphocyte # : x  Auto Monocyte # : x  Auto Eosinophil # : x  Auto Basophil # : x  Auto Neutrophil % : x  Auto Lymphocyte % : x  Auto Monocyte % : x  Auto Eosinophil % : x  Auto Basophil % : x    BMP:    04-23 @ 05:43    Blood Urea Nitrogen - 10  Calcium - 9.6  Carbond Dioxide - 26  Chloride - 100  Creatinine - 0.9  Glucose - 98  Potassium - 4.5  Sodium - 137       MEDICATIONS  (STANDING):  amLODIPine   Tablet 5 milliGRAM(s) Oral daily  chlorhexidine 4% Liquid 1 Application(s) Topical <User Schedule>  folic acid 1 milliGRAM(s) Oral daily  lactulose Syrup 10 Gram(s) Oral two times a day  magnesium oxide 400 milliGRAM(s) Oral three times a day with meals  multivitamin/minerals 1 Tablet(s) Oral daily  pantoprazole    Tablet 40 milliGRAM(s) Oral before breakfast  polyethylene glycol 3350 17 Gram(s) Oral daily  rivaroxaban 20 milliGRAM(s) Oral with dinner  senna 2 Tablet(s) Oral at bedtime    MEDICATIONS  (PRN):  LORazepam   Injectable 2 milliGRAM(s) IV Push every 2 hours PRN CIWA score 8-15  LORazepam   Injectable 4 milliGRAM(s) IV Push every 1 hour PRN CIWA score greater than 15  ondansetron    Tablet 4 milliGRAM(s) Oral every 8 hours PRN Nausea and/or Vomiting  oxyCODONE    IR 5 milliGRAM(s) Oral every 6 hours PRN Severe Pain (7 - 10)

## 2021-04-26 VITALS
RESPIRATION RATE: 18 BRPM | DIASTOLIC BLOOD PRESSURE: 87 MMHG | SYSTOLIC BLOOD PRESSURE: 134 MMHG | HEART RATE: 64 BPM | TEMPERATURE: 97 F

## 2021-04-26 PROCEDURE — 99239 HOSP IP/OBS DSCHRG MGMT >30: CPT

## 2021-04-26 RX ORDER — GABAPENTIN 400 MG/1
1 CAPSULE ORAL
Qty: 90 | Refills: 0
Start: 2021-04-26 | End: 2021-05-25

## 2021-04-26 RX ADMIN — MAGNESIUM OXIDE 400 MG ORAL TABLET 400 MILLIGRAM(S): 241.3 TABLET ORAL at 08:03

## 2021-04-26 RX ADMIN — OXYCODONE HYDROCHLORIDE 5 MILLIGRAM(S): 5 TABLET ORAL at 08:05

## 2021-04-26 RX ADMIN — PANTOPRAZOLE SODIUM 40 MILLIGRAM(S): 20 TABLET, DELAYED RELEASE ORAL at 06:50

## 2021-04-26 RX ADMIN — AMLODIPINE BESYLATE 5 MILLIGRAM(S): 2.5 TABLET ORAL at 06:50

## 2021-04-26 RX ADMIN — CHLORHEXIDINE GLUCONATE 1 APPLICATION(S): 213 SOLUTION TOPICAL at 06:50

## 2021-04-26 NOTE — PROGRESS NOTE ADULT - PROVIDER SPECIALTY LIST ADULT
Hospitalist
Internal Medicine
Hospitalist
Internal Medicine
Internal Medicine
Hospitalist
Hospitalist

## 2021-04-26 NOTE — PROGRESS NOTE ADULT - REASON FOR ADMISSION
abdominal pain

## 2021-04-26 NOTE — PROGRESS NOTE ADULT - SUBJECTIVE AND OBJECTIVE BOX
YESSICA BECKFORD 60y Male  MRN#: [id number]   Hospital Day: 9d    SUBJECTIVE  Patient is a 60y old Male who presents with a chief complaint of abdominal pain (25 Apr 2021 14:25)  Currently admitted to medicine with the primary diagnosis of Pancreatitis      INTERVAL HPI AND OVERNIGHT EVENTS:  Patient was examined and seen at bedside. This morning he is resting comfortably in bed.  no interval events--today, pt states he is amenable to DC as he says he has a spot at in EtOH rehab facility. He is requesting a prescription for oxycodone upon discharge    REVIEW OF SYMPTOMS:  +abdominal pain, denies any n/vd/c/.    OBJECTIVE  PAST MEDICAL & SURGICAL HISTORY  DVT (deep venous thrombosis)    HTN (hypertension)    EtOH dependence    H/O chronic pancreatitis      ALLERGIES:  No Known Allergies    MEDICATIONS:  STANDING MEDICATIONS  amLODIPine   Tablet 5 milliGRAM(s) Oral daily  chlorhexidine 4% Liquid 1 Application(s) Topical <User Schedule>  folic acid 1 milliGRAM(s) Oral daily  lactulose Syrup 10 Gram(s) Oral two times a day  magnesium oxide 400 milliGRAM(s) Oral three times a day with meals  multivitamin/minerals 1 Tablet(s) Oral daily  pantoprazole    Tablet 40 milliGRAM(s) Oral before breakfast  polyethylene glycol 3350 17 Gram(s) Oral daily  rivaroxaban 20 milliGRAM(s) Oral with dinner  senna 2 Tablet(s) Oral at bedtime    PRN MEDICATIONS  LORazepam   Injectable 2 milliGRAM(s) IV Push every 2 hours PRN  LORazepam   Injectable 4 milliGRAM(s) IV Push every 1 hour PRN  ondansetron    Tablet 4 milliGRAM(s) Oral every 8 hours PRN  oxyCODONE    IR 5 milliGRAM(s) Oral every 6 hours PRN      VITAL SIGNS: Last 24 Hours  T(C): 36.3 (26 Apr 2021 04:55), Max: 36.6 (25 Apr 2021 13:41)  T(F): 97.3 (26 Apr 2021 04:55), Max: 97.9 (25 Apr 2021 13:41)  HR: 64 (26 Apr 2021 04:55) (64 - 70)  BP: 134/87 (26 Apr 2021 04:55) (134/87 - 144/66)  BP(mean): --  RR: 18 (26 Apr 2021 04:55) (18 - 18)  SpO2: --    LABS:      RADIOLOGY:      PHYSICAL EXAM:  CONSTITUTIONAL: No acute distress, well-developed AAOx3  HEAD: Atraumatic, normocephalic  EYES: Sclera clear, no icterus, eomi  ENT: Supple  PULMONARY: Clear to auscultation bilaterally  CARDIOVASCULAR: Regular rate and rhythm;  GASTROINTESTINAL: Soft, +epigastric tenderness, no rebound/guarding  MUSCULOSKELETAL: moves 4/4 extremities, no edema  NEUROLOGY: non-focal  SKIN: Intact    ASSESSMENT & PLAN  59 yo male with pmh of alcohol use disorder, HTN, DVT on Xarelto presents to the hospital with 2 day history of constant 10/10 non radiating anna-umbilical abdominal pain associated with nausea and nbnb vomiting. He reports he drinks about 5 pints of vodka / wine daily and his last drink was yesterday which resulted in vomiting. The patient notes that he has been drinking for many years, he  was diagnosed with acute recurrent alcohol induced pancreatitis, pt was started on IV hydration, pain meds PRN, kept NPO. Addiction medicine was consulted and CATCH team following for inpt etoh rehab placement    # Acute on chronic Pancreatitis 2/2 chronic EtOH abuse  - pain medications PRN--changed IV morphine to oxy 5 IR PRN severe (c/w percocet prn moderate)   - Advance diet to low-fat but regular consistency  - Addiction medicine following for possible rehab placement  - c/w Carafate 1g BID (possible element of erosive gastritis from chronic EtOH abuse)  - Zofran PRN    # Constipation--resolved  - c/w Senna, Miralax, Lactulose   - Bentyl 10 mg TID     # Alcoholism / Etoh induced liver injury   - RUQ +ve for hepatic steatosis  - CIWA score 0 in AM  - ativan PRN--has not received in several days   - catch team following for possible inpt rehab placement  - c/w folic acid and multivitamin  - monitor and replete electrolytes ,  - monitor LFTS  - avoid hepatotoxic medications     # h/o HTN  - c/w amlodipine     # Thrombocytopenia   - likely due to BMS due to alcohol abuse   - monitor platelets     # h/o b/l DVT   - Pt states that he had L calf DVT 5 years ago provoked by immobilization and had R leg DVT 3.5 yrs ago again provoked by immobilization and resulted in pulmonary embolism. Of note, pt was discharged after 1st DVT on xarelto and never changed after recurrence. He endorses compliance.  - c/w Xarelto  - repeat LE Doppler -ve for any DVT  - pt has a high risk for AC ( heavy drinker) but benefits>risks in light of h/o multiple DVTs with resultant PE    MISC:  # DVT PPX: Xarelto  # GI PPX: Protonix  # Activity: as tolerated  # Diet--Regular, low fat  # FULL CODE  # Dispo: States he is amenable to DC today to inpt rehab--will give rx for gabapentin for chronic pancreatitis pain and will give information for pain specialist in discharge packet      PAST MEDICAL & SURGICAL HISTORY:  DVT (deep venous thrombosis)    HTN (hypertension)    EtOH dependence    H/O chronic pancreatitis

## 2021-04-26 NOTE — PROGRESS NOTE ADULT - NSICDXPILOT_GEN_ALL_CORE
[place]

## 2021-04-29 DIAGNOSIS — Z86.718 PERSONAL HISTORY OF OTHER VENOUS THROMBOSIS AND EMBOLISM: ICD-10-CM

## 2021-04-29 DIAGNOSIS — Z79.01 LONG TERM (CURRENT) USE OF ANTICOAGULANTS: ICD-10-CM

## 2021-04-29 DIAGNOSIS — K59.00 CONSTIPATION, UNSPECIFIED: ICD-10-CM

## 2021-04-29 DIAGNOSIS — I10 ESSENTIAL (PRIMARY) HYPERTENSION: ICD-10-CM

## 2021-04-29 DIAGNOSIS — E61.2 MAGNESIUM DEFICIENCY: ICD-10-CM

## 2021-04-29 DIAGNOSIS — K86.0 ALCOHOL-INDUCED CHRONIC PANCREATITIS: ICD-10-CM

## 2021-04-29 DIAGNOSIS — K85.20 ALCOHOL INDUCED ACUTE PANCREATITIS WITHOUT NECROSIS OR INFECTION: ICD-10-CM

## 2021-04-29 DIAGNOSIS — F10.239 ALCOHOL DEPENDENCE WITH WITHDRAWAL, UNSPECIFIED: ICD-10-CM

## 2021-04-29 DIAGNOSIS — K70.9 ALCOHOLIC LIVER DISEASE, UNSPECIFIED: ICD-10-CM

## 2021-04-29 DIAGNOSIS — K76.0 FATTY (CHANGE OF) LIVER, NOT ELSEWHERE CLASSIFIED: ICD-10-CM

## 2021-04-29 DIAGNOSIS — R10.9 UNSPECIFIED ABDOMINAL PAIN: ICD-10-CM

## 2021-04-29 DIAGNOSIS — D69.6 THROMBOCYTOPENIA, UNSPECIFIED: ICD-10-CM

## 2021-04-30 ENCOUNTER — EMERGENCY (EMERGENCY)
Facility: HOSPITAL | Age: 61
LOS: 0 days | Discharge: HOME | End: 2021-04-30
Attending: EMERGENCY MEDICINE | Admitting: EMERGENCY MEDICINE
Payer: MEDICARE

## 2021-04-30 VITALS
HEIGHT: 69 IN | DIASTOLIC BLOOD PRESSURE: 88 MMHG | WEIGHT: 134.92 LBS | HEART RATE: 94 BPM | RESPIRATION RATE: 18 BRPM | SYSTOLIC BLOOD PRESSURE: 167 MMHG | TEMPERATURE: 97 F | OXYGEN SATURATION: 98 %

## 2021-04-30 VITALS
SYSTOLIC BLOOD PRESSURE: 154 MMHG | TEMPERATURE: 97 F | RESPIRATION RATE: 18 BRPM | HEART RATE: 88 BPM | DIASTOLIC BLOOD PRESSURE: 74 MMHG | OXYGEN SATURATION: 98 %

## 2021-04-30 DIAGNOSIS — F10.20 ALCOHOL DEPENDENCE, UNCOMPLICATED: ICD-10-CM

## 2021-04-30 DIAGNOSIS — Z79.01 LONG TERM (CURRENT) USE OF ANTICOAGULANTS: ICD-10-CM

## 2021-04-30 DIAGNOSIS — F17.200 NICOTINE DEPENDENCE, UNSPECIFIED, UNCOMPLICATED: ICD-10-CM
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
## 2021-04-30 DIAGNOSIS — Z79.899 OTHER LONG TERM (CURRENT) DRUG THERAPY: ICD-10-CM

## 2021-04-30 DIAGNOSIS — R25.9 UNSPECIFIED ABNORMAL INVOLUNTARY MOVEMENTS: ICD-10-CM

## 2021-04-30 DIAGNOSIS — Z86.718 PERSONAL HISTORY OF OTHER VENOUS THROMBOSIS AND EMBOLISM: ICD-10-CM

## 2021-04-30 DIAGNOSIS — I10 ESSENTIAL (PRIMARY) HYPERTENSION: ICD-10-CM

## 2021-04-30 PROBLEM — I82.409 ACUTE EMBOLISM AND THROMBOSIS OF UNSPECIFIED DEEP VEINS OF UNSPECIFIED LOWER EXTREMITY: Chronic | Status: ACTIVE | Noted: 2021-04-16

## 2021-04-30 PROBLEM — Z87.19 PERSONAL HISTORY OF OTHER DISEASES OF THE DIGESTIVE SYSTEM: Chronic | Status: ACTIVE | Noted: 2021-04-17

## 2021-04-30 PROCEDURE — 99284 EMERGENCY DEPT VISIT MOD MDM: CPT

## 2021-04-30 RX ORDER — RIVAROXABAN 15 MG-20MG
1 KIT ORAL
Qty: 0 | Refills: 0 | DISCHARGE

## 2021-04-30 RX ORDER — DIAZEPAM 5 MG
10 TABLET ORAL ONCE
Refills: 0 | Status: DISCONTINUED | OUTPATIENT
Start: 2021-04-30 | End: 2021-04-30

## 2021-04-30 RX ORDER — FOLIC ACID 0.8 MG
1 TABLET ORAL
Qty: 0 | Refills: 0 | DISCHARGE

## 2021-04-30 RX ORDER — AMLODIPINE BESYLATE 2.5 MG/1
1 TABLET ORAL
Qty: 0 | Refills: 0 | DISCHARGE

## 2021-04-30 RX ADMIN — Medication 10 MILLIGRAM(S): at 15:25

## 2021-04-30 NOTE — ED PROVIDER NOTE - NS ED ROS FT
Constitutional:  No fevers or chills.  Eyes:  No visual changes.  ENT:  No hearing changes. No sore throat.  Neck:  No neck pain.  Cardiac:  No CP.  Resp:  No cough or SOB.   GI:  No nausea, vomiting, diarrhea, or bloody stool.  MSK:  No myalgias.  Neuro:  No headache, dizziness, or weakness.  Skin:  No skin rash.

## 2021-04-30 NOTE — ED ADULT NURSE NOTE - PMH
DVT (deep venous thrombosis)    EtOH dependence    H/O chronic pancreatitis    HTN (hypertension)

## 2021-04-30 NOTE — ED ADULT TRIAGE NOTE - CHIEF COMPLAINT QUOTE
pt was sent to ED for medical clearance for admission to detox at [address]  Pt states he drinks 6 pts of wine/day

## 2021-04-30 NOTE — ED ADULT NURSE NOTE - CHIEF COMPLAINT QUOTE
pt was sent to ED for medical clearance for admission to detox at [address]  Pt states he drinks 6 pts of wine/day

## 2021-04-30 NOTE — ED PROVIDER NOTE - PROGRESS NOTE DETAILS
Omi- Patient given Valium for prevention of withdrawal sxs. VS stable in ED, patient requesting detox however no inpt detox is available. Given comprehensive list of detox facilities. Advised outpt f/u with strict return precaution signs/sxs. Full DC instructions and precaution signs and symptoms discussed. Proper follow up ensured.  Medications administered and prescribed/OTC home meds discussed.  All questions and concerns from patient addressed.  Understanding of instructions verbalized.

## 2021-04-30 NOTE — ED PROVIDER NOTE - PHYSICAL EXAMINATION
PHYSICAL EXAM: I have reviewed current vital signs.  GENERAL: NAD.  HEAD:  Normocephalic, atraumatic.  EYES: Conjunctiva and sclera clear.  ENT: MMM.  NECK: Supple, FROM.  CHEST/LUNG: Clear to auscultation bilaterally; no wheezes, rales, or rhonchi.  HEART: Regular rate and rhythm, normal S1 and S2; no murmurs, rubs, or gallops.  ABDOMEN: Soft, nontender, nondistended, no peritoneal signs.  EXTREMITIES:  2+ peripheral pulses; FROM.  NEUROLOGY: A&O x 3. Motor 5/5. No focal neurological deficits.   SKIN: Warm and dry.

## 2021-04-30 NOTE — ED PROVIDER NOTE - NSFOLLOWUPCLINICS_GEN_ALL_ED_FT
[facility]  Detox Mgmt  [address] [place] [address]  Phone: [phone]  Fax:   Follow Up Time: Routine

## 2021-04-30 NOTE — ED PROVIDER NOTE - PATIENT PORTAL LINK FT
You can access the FollowMyHealth Patient Portal offered by [facility] by registering at the following website: [email or web address]. By joining [facility]’s FollowMyHealth portal, you will also be able to view your health information using other applications (apps) compatible with our system.

## 2021-04-30 NOTE — ED PROVIDER NOTE - ATTENDING CONTRIBUTION TO CARE
Pt was trying to get to rehab program but felt tremulous so the program called 911.  Pt feels well, denies abdominal pain, last drink yesterday.  Mild tremor.  No vomiting.    Exam: RRR, CTAB, soft NTa dbomen, NAD, normal gait  Plan: doug bowser to rehab

## 2021-04-30 NOTE — ED PROVIDER NOTE - OBJECTIVE STATEMENT
61yo M with PMH of alcoholism, HTN, chronic abdominal pain, DVT on Xarelto presenting to ED wanting detox. Patient came to ED from [facility] their concern for him developing withdrawal sxs/shakes. Patient currently stable in ED with no active complaints. Denies any f/c, cough, CP, SOB, increased abd pain from baseline, n/v/d, bloody stool, or injuries/traumas/falls/syncope. States he feels he may start to withdraw soon as his last drink was 11PM last night.

## 2021-05-09 ENCOUNTER — EMERGENCY (EMERGENCY)
Facility: HOSPITAL | Age: 61
LOS: 0 days | Discharge: HOME | End: 2021-05-10
Attending: EMERGENCY MEDICINE | Admitting: EMERGENCY MEDICINE
Payer: MEDICARE

## 2021-05-09 VITALS
OXYGEN SATURATION: 99 % | HEART RATE: 89 BPM | TEMPERATURE: 98 F | HEIGHT: 69 IN | DIASTOLIC BLOOD PRESSURE: 102 MMHG | SYSTOLIC BLOOD PRESSURE: 160 MMHG | RESPIRATION RATE: 18 BRPM

## 2021-05-09 DIAGNOSIS — Z86.718 PERSONAL HISTORY OF OTHER VENOUS THROMBOSIS AND EMBOLISM: ICD-10-CM

## 2021-05-09 DIAGNOSIS — Z87.19 PERSONAL HISTORY OF OTHER DISEASES OF THE DIGESTIVE SYSTEM: ICD-10-CM

## 2021-05-09 DIAGNOSIS — Z79.01 LONG TERM (CURRENT) USE OF ANTICOAGULANTS: ICD-10-CM

## 2021-05-09 DIAGNOSIS — Z79.899 OTHER LONG TERM (CURRENT) DRUG THERAPY: ICD-10-CM

## 2021-05-09 DIAGNOSIS — R10.10 UPPER ABDOMINAL PAIN, UNSPECIFIED: ICD-10-CM

## 2021-05-09 DIAGNOSIS — F10.10 ALCOHOL ABUSE, UNCOMPLICATED: ICD-10-CM

## 2021-05-09 DIAGNOSIS — K80.20 CALCULUS OF GALLBLADDER WITHOUT CHOLECYSTITIS WITHOUT OBSTRUCTION: ICD-10-CM

## 2021-05-09 DIAGNOSIS — I10 ESSENTIAL (PRIMARY) HYPERTENSION: ICD-10-CM

## 2021-05-09 LAB
ALBUMIN SERPL ELPH-MCNC: 4.5 G/DL — SIGNIFICANT CHANGE UP (ref 3.5–5.2)
ALP SERPL-CCNC: 90 U/L — SIGNIFICANT CHANGE UP (ref 30–115)
ALT FLD-CCNC: 23 U/L — SIGNIFICANT CHANGE UP (ref 0–41)
ANION GAP SERPL CALC-SCNC: 11 MMOL/L — SIGNIFICANT CHANGE UP (ref 7–14)
APPEARANCE UR: CLEAR — SIGNIFICANT CHANGE UP
AST SERPL-CCNC: 26 U/L — SIGNIFICANT CHANGE UP (ref 0–41)
BASOPHILS # BLD AUTO: 0.02 K/UL — SIGNIFICANT CHANGE UP (ref 0–0.2)
BASOPHILS NFR BLD AUTO: 0.3 % — SIGNIFICANT CHANGE UP (ref 0–1)
BILIRUB SERPL-MCNC: <0.2 MG/DL — SIGNIFICANT CHANGE UP (ref 0.2–1.2)
BILIRUB UR-MCNC: NEGATIVE — SIGNIFICANT CHANGE UP
BUN SERPL-MCNC: 13 MG/DL — SIGNIFICANT CHANGE UP (ref 10–20)
CALCIUM SERPL-MCNC: 10 MG/DL — SIGNIFICANT CHANGE UP (ref 8.5–10.1)
CHLORIDE SERPL-SCNC: 100 MMOL/L — SIGNIFICANT CHANGE UP (ref 98–110)
CO2 SERPL-SCNC: 29 MMOL/L — SIGNIFICANT CHANGE UP (ref 17–32)
COLOR SPEC: COLORLESS — SIGNIFICANT CHANGE UP
CREAT SERPL-MCNC: 0.7 MG/DL — SIGNIFICANT CHANGE UP (ref 0.7–1.5)
DIFF PNL FLD: NEGATIVE — SIGNIFICANT CHANGE UP
EOSINOPHIL # BLD AUTO: 0.19 K/UL — SIGNIFICANT CHANGE UP (ref 0–0.7)
EOSINOPHIL NFR BLD AUTO: 3.2 % — SIGNIFICANT CHANGE UP (ref 0–8)
GLUCOSE SERPL-MCNC: 86 MG/DL — SIGNIFICANT CHANGE UP (ref 70–99)
GLUCOSE UR QL: NEGATIVE — SIGNIFICANT CHANGE UP
HCT VFR BLD CALC: 42.3 % — SIGNIFICANT CHANGE UP (ref 42–52)
HGB BLD-MCNC: 14.2 G/DL — SIGNIFICANT CHANGE UP (ref 14–18)
IMM GRANULOCYTES NFR BLD AUTO: 0.3 % — SIGNIFICANT CHANGE UP (ref 0.1–0.3)
KETONES UR-MCNC: NEGATIVE — SIGNIFICANT CHANGE UP
LACTATE SERPL-SCNC: 1 MMOL/L — SIGNIFICANT CHANGE UP (ref 0.7–2)
LEUKOCYTE ESTERASE UR-ACNC: NEGATIVE — SIGNIFICANT CHANGE UP
LIDOCAIN IGE QN: 22 U/L — SIGNIFICANT CHANGE UP (ref 7–60)
LYMPHOCYTES # BLD AUTO: 1.68 K/UL — SIGNIFICANT CHANGE UP (ref 1.2–3.4)
LYMPHOCYTES # BLD AUTO: 28.2 % — SIGNIFICANT CHANGE UP (ref 20.5–51.1)
MCHC RBC-ENTMCNC: 29.1 PG — SIGNIFICANT CHANGE UP (ref 27–31)
MCHC RBC-ENTMCNC: 33.6 G/DL — SIGNIFICANT CHANGE UP (ref 32–37)
MCV RBC AUTO: 86.7 FL — SIGNIFICANT CHANGE UP (ref 80–94)
MONOCYTES # BLD AUTO: 0.67 K/UL — HIGH (ref 0.1–0.6)
MONOCYTES NFR BLD AUTO: 11.2 % — HIGH (ref 1.7–9.3)
NEUTROPHILS # BLD AUTO: 3.38 K/UL — SIGNIFICANT CHANGE UP (ref 1.4–6.5)
NEUTROPHILS NFR BLD AUTO: 56.8 % — SIGNIFICANT CHANGE UP (ref 42.2–75.2)
NITRITE UR-MCNC: NEGATIVE — SIGNIFICANT CHANGE UP
NRBC # BLD: 0 /100 WBCS — SIGNIFICANT CHANGE UP (ref 0–0)
PH UR: 7.5 — SIGNIFICANT CHANGE UP (ref 5–8)
PLATELET # BLD AUTO: 223 K/UL — SIGNIFICANT CHANGE UP (ref 130–400)
POTASSIUM SERPL-MCNC: 4.4 MMOL/L — SIGNIFICANT CHANGE UP (ref 3.5–5)
POTASSIUM SERPL-SCNC: 4.4 MMOL/L — SIGNIFICANT CHANGE UP (ref 3.5–5)
PROT SERPL-MCNC: 7.7 G/DL — SIGNIFICANT CHANGE UP (ref 6–8)
PROT UR-MCNC: NEGATIVE — SIGNIFICANT CHANGE UP
RBC # BLD: 4.88 M/UL — SIGNIFICANT CHANGE UP (ref 4.7–6.1)
RBC # FLD: 12.7 % — SIGNIFICANT CHANGE UP (ref 11.5–14.5)
SODIUM SERPL-SCNC: 140 MMOL/L — SIGNIFICANT CHANGE UP (ref 135–146)
SP GR SPEC: 1.01 — SIGNIFICANT CHANGE UP (ref 1.01–1.03)
UROBILINOGEN FLD QL: SIGNIFICANT CHANGE UP
WBC # BLD: 5.96 K/UL — SIGNIFICANT CHANGE UP (ref 4.8–10.8)
WBC # FLD AUTO: 5.96 K/UL — SIGNIFICANT CHANGE UP (ref 4.8–10.8)

## 2021-05-09 PROCEDURE — 99285 EMERGENCY DEPT VISIT HI MDM: CPT

## 2021-05-09 PROCEDURE — 93010 ELECTROCARDIOGRAM REPORT: CPT

## 2021-05-09 PROCEDURE — 74177 CT ABD & PELVIS W/CONTRAST: CPT | Mod: 26,MA

## 2021-05-09 PROCEDURE — 76705 ECHO EXAM OF ABDOMEN: CPT | Mod: 26

## 2021-05-09 RX ORDER — MORPHINE SULFATE 50 MG/1
4 CAPSULE, EXTENDED RELEASE ORAL ONCE
Refills: 0 | Status: DISCONTINUED | OUTPATIENT
Start: 2021-05-09 | End: 2021-05-09

## 2021-05-09 RX ORDER — ONDANSETRON 8 MG/1
4 TABLET, FILM COATED ORAL ONCE
Refills: 0 | Status: COMPLETED | OUTPATIENT
Start: 2021-05-09 | End: 2021-05-09

## 2021-05-09 RX ORDER — SODIUM CHLORIDE 9 MG/ML
1000 INJECTION INTRAMUSCULAR; INTRAVENOUS; SUBCUTANEOUS ONCE
Refills: 0 | Status: COMPLETED | OUTPATIENT
Start: 2021-05-09 | End: 2021-05-09

## 2021-05-09 RX ADMIN — MORPHINE SULFATE 4 MILLIGRAM(S): 50 CAPSULE, EXTENDED RELEASE ORAL at 19:59

## 2021-05-09 RX ADMIN — ONDANSETRON 4 MILLIGRAM(S): 8 TABLET, FILM COATED ORAL at 19:59

## 2021-05-09 RX ADMIN — MORPHINE SULFATE 4 MILLIGRAM(S): 50 CAPSULE, EXTENDED RELEASE ORAL at 21:20

## 2021-05-09 RX ADMIN — SODIUM CHLORIDE 1000 MILLILITER(S): 9 INJECTION INTRAMUSCULAR; INTRAVENOUS; SUBCUTANEOUS at 19:58

## 2021-05-09 NOTE — ED PROVIDER NOTE - NS ED ROS FT
Constitutional: See HPI.  Eyes: No visual changes, eye pain or discharge.  ENMT: No hearing changes, pain, discharge or infections.   Cardiac: No SOB or edema. No chest pain with exertion.  Respiratory: No cough or respiratory distress.   GI: + abd pain. No vomiting, diarrhea  : No dysuria, frequency or burning. No Discharge  MS: No myalgia, muscle weakness, joint pain or back pain.  Neuro: No headache or weakness.   Skin: No skin rash.  Except as documented in the HPI, all other systems are negative.

## 2021-05-09 NOTE — ED PROVIDER NOTE - PROGRESS NOTE DETAILS
Discussed results with patient . pt no longer has abdominal pain, abdomen soft and non tender. will follow up with gi and pmd labs reviewed, cbc, cmp, lft's, lipase wnl.  ua neg. RUQ sono: unremarkable,  no cholelithiasis, no gallbladder wall thickening, no pericholecystic fluid.  PRELIM ct abd: cholelithiasis with mild wall thickening : these findings not seen on sono.  pt feeling much better now, states pain has resolved, eating and drinking in ER, does not want to  stay in ER for any further w/u.  pt told to f/u with med clinic, told to return to ER if pain returns, for n/v, fever, or any other new/concerning symptoms.  pt understands and agrees with plan.

## 2021-05-09 NOTE — ED PROVIDER NOTE - ATTENDING CONTRIBUTION TO CARE
62 y/o male with h/o etoh abuse, htn, dvt on xarelto, in ER with c/o abd pain.  Pt was admitted last month for pancreatitis, was d/c'd ~ 2 weeks ago, states the pain has returned today after eating.  Pain to upper abdomen.  +n/v (non-bloody). no diarrhea.  no cp/sob.  no ha/dizziness/loc.  no le pain/swelling.  states last alcoholic drink was ~ 9 days ago.  denies h/o abd surgeries in past.  PE - nad, nc/at, eomi, perrl, op -clear, mmm, cta b/l, no w/r/r, rrr, abd- soft, + upper abdominal tenderness with some voluntary guarding, nabs, from x 4, no LE swelling/tenderness, A&O x 3, no focal neuro deficits.  -check labs, ct abd, RUQ sono, re-eval.

## 2021-05-09 NOTE — ED PROVIDER NOTE - OBJECTIVE STATEMENT
61 y.o male w/ of alcohol abuse, pancreatitis, HTN, DVT on xarelto presents to the ED for evaluation of abd pain since this evening.  After eating developed upper abd pain, sharp, stabbing, worse w/ PO intake, moderate severity, no radiation of pain. Denies fever, chills, vomiting, chest pain, dyspnea, urinary sxs.  NO further complaints.  States this feels similar to previous episodes of pancreatitis.

## 2021-05-09 NOTE — ED PROVIDER NOTE - PHYSICAL EXAMINATION
CONST: Well appearing in NAD  EYES:  Sclera and conjunctiva clear.  CARD: Normal S1 S2; Normal rate and rhythm  RESP: Equal BS B/L, No wheezes, rhonchi or rales. No distress  GI: Soft, diffuse TTP upper abd quadrants, + myers's, no CVA tenderness, non-distended.  MS: Normal ROM in all extremities. No edema of lower extremities, no calf pain, radial pulses 2+ bilaterally  SKIN: Warm, dry, no acute rashes. Good turgor  NEURO: A&Ox3, No focal deficits. Strength 5/5 with no sensory deficits. Steady gait

## 2021-05-09 NOTE — ED ADULT NURSE NOTE - OBJECTIVE STATEMENT
Pt reports lower abdominal pain with nausea and vomiting starting yesterday. Pt states that he has pancreatitis. States that he was admitted and discharged recently, and now has the same issues. Chronic etoh abuse, but none since prior to the pancreatitis admission. States he has breakfast this morning and vomited multiple times with extreme pain.

## 2021-05-09 NOTE — ED ADULT TRIAGE NOTE - CHIEF COMPLAINT QUOTE
pt c/o of abdominal pain + vomiting today. denies blood in vomit/ last episode was a half hour ago. pt has hx of pancreatitis

## 2021-05-09 NOTE — ED PROVIDER NOTE - PATIENT PORTAL LINK FT
You can access the FollowMyHealth Patient Portal offered by [facility] by registering at the following website: [email or web address]. By joining [facility]’s FollowMyHealth portal, you will also be able to view your health information using other applications (apps) compatible with our system.

## 2021-05-09 NOTE — ED PROVIDER NOTE - CARE PROVIDER_API CALL
Sheldon Dennis)  Gastroenterology; Internal Medicine  [address] [place] [address]  Phone: [phone]  Fax: [phone]  Follow Up Time:

## 2021-05-10 RX ORDER — IBUPROFEN 200 MG
600 TABLET ORAL ONCE
Refills: 0 | Status: DISCONTINUED | OUTPATIENT
Start: 2021-05-10 | End: 2021-05-10
